# Patient Record
Sex: FEMALE
[De-identification: names, ages, dates, MRNs, and addresses within clinical notes are randomized per-mention and may not be internally consistent; named-entity substitution may affect disease eponyms.]

---

## 2017-10-04 ENCOUNTER — APPOINTMENT (OUTPATIENT)
Dept: ANTEPARTUM | Facility: CLINIC | Age: 27
End: 2017-10-04
Payer: MEDICAID

## 2017-10-04 ENCOUNTER — ASOB RESULT (OUTPATIENT)
Age: 27
End: 2017-10-04

## 2017-10-04 PROBLEM — Z00.00 ENCOUNTER FOR PREVENTIVE HEALTH EXAMINATION: Status: ACTIVE | Noted: 2017-10-04

## 2017-10-04 PROCEDURE — 76801 OB US < 14 WKS SINGLE FETUS: CPT

## 2017-11-08 ENCOUNTER — EMERGENCY (EMERGENCY)
Facility: HOSPITAL | Age: 27
LOS: 1 days | Discharge: HOME | End: 2017-11-08
Attending: EMERGENCY MEDICINE
Payer: COMMERCIAL

## 2017-11-08 VITALS
OXYGEN SATURATION: 100 % | RESPIRATION RATE: 18 BRPM | HEART RATE: 98 BPM | HEIGHT: 59.84 IN | WEIGHT: 134.04 LBS | DIASTOLIC BLOOD PRESSURE: 75 MMHG | TEMPERATURE: 98 F | SYSTOLIC BLOOD PRESSURE: 108 MMHG

## 2017-11-08 LAB
APPEARANCE UR: CLEAR — SIGNIFICANT CHANGE UP
BILIRUB UR-MCNC: NEGATIVE — SIGNIFICANT CHANGE UP
COLOR SPEC: YELLOW — SIGNIFICANT CHANGE UP
DIFF PNL FLD: NEGATIVE — SIGNIFICANT CHANGE UP
GLUCOSE UR QL: NEGATIVE MG/DL — SIGNIFICANT CHANGE UP
KETONES UR-MCNC: NEGATIVE — SIGNIFICANT CHANGE UP
LEUKOCYTE ESTERASE UR-ACNC: NEGATIVE — SIGNIFICANT CHANGE UP
NITRITE UR-MCNC: NEGATIVE — SIGNIFICANT CHANGE UP
PH UR: 6.5 — SIGNIFICANT CHANGE UP (ref 5–8)
PROT UR-MCNC: NEGATIVE MG/DL — SIGNIFICANT CHANGE UP
SP GR SPEC: 1.01 — SIGNIFICANT CHANGE UP (ref 1.01–1.02)
UROBILINOGEN FLD QL: NEGATIVE MG/DL — SIGNIFICANT CHANGE UP

## 2017-11-08 PROCEDURE — 99283 EMERGENCY DEPT VISIT LOW MDM: CPT

## 2017-11-08 PROCEDURE — 99284 EMERGENCY DEPT VISIT MOD MDM: CPT

## 2017-11-08 PROCEDURE — T1013: CPT

## 2017-11-08 PROCEDURE — 81003 URINALYSIS AUTO W/O SCOPE: CPT

## 2017-11-08 PROCEDURE — 87086 URINE CULTURE/COLONY COUNT: CPT

## 2017-11-08 RX ORDER — ACETAMINOPHEN 500 MG
2 TABLET ORAL
Qty: 18 | Refills: 0
Start: 2017-11-08 | End: 2017-11-11

## 2017-11-08 NOTE — ED STATDOCS - PROGRESS NOTE DETAILS
Agreed with HPI/PE/ROS/Orders from intake. Will f/u with results from urine UA-Unremarkable, pt is explained results and discharge instructions, pt verbalizes results and discharge instructions

## 2017-11-08 NOTE — ED STATDOCS - ATTENDING CONTRIBUTION TO CARE
I, Dr. Galicia, performed the initial face to face bedside interview with this patient regarding history of present illness, review of symptoms and relevant past medical, social and family history.  I completed an independent physical examination.  I was the initial provider who evaluated this patient. I have signed out the follow up of any pending tests (i.e. labs, radiological studies) to the ACP.  I have communicated the patient’s plan of care and disposition with the ACP.

## 2017-11-08 NOTE — ED STATDOCS - OBJECTIVE STATEMENT
25 y/o pregnant F pt presents to ED c/o right sided flank pain that onset today at 1400. Pain worsens with deep breaths. She rates her pain a 3/10. Pt is unsure of LMP. Denies trauma or injury. Denies having this pain in the past. Denies taking medications. Denies vaginal bleeding, N/V/D, fever, chills, SOB, and CP. No further complaints at this time.

## 2017-11-09 LAB
CULTURE RESULTS: NO GROWTH — SIGNIFICANT CHANGE UP
SPECIMEN SOURCE: SIGNIFICANT CHANGE UP

## 2018-01-25 ENCOUNTER — INPATIENT (INPATIENT)
Facility: HOSPITAL | Age: 28
LOS: 0 days | Discharge: STILL ADMITTED OR ADMINISTRATIVE | End: 2018-01-26
Attending: OBSTETRICS & GYNECOLOGY | Admitting: OBSTETRICS & GYNECOLOGY
Payer: COMMERCIAL

## 2018-01-25 VITALS
TEMPERATURE: 98 F | WEIGHT: 149.91 LBS | SYSTOLIC BLOOD PRESSURE: 136 MMHG | DIASTOLIC BLOOD PRESSURE: 71 MMHG | HEART RATE: 110 BPM | HEIGHT: 67 IN | OXYGEN SATURATION: 100 % | RESPIRATION RATE: 18 BRPM

## 2018-01-25 VITALS — WEIGHT: 145.51 LBS | HEIGHT: 67 IN

## 2018-01-25 DIAGNOSIS — O15.03: ICD-10-CM

## 2018-01-25 DIAGNOSIS — M79.662 PAIN IN LEFT LOWER LEG: ICD-10-CM

## 2018-01-25 DIAGNOSIS — Z3A.29 29 WEEKS GESTATION OF PREGNANCY: ICD-10-CM

## 2018-01-25 DIAGNOSIS — R56.9 UNSPECIFIED CONVULSIONS: ICD-10-CM

## 2018-01-25 DIAGNOSIS — Z29.9 ENCOUNTER FOR PROPHYLACTIC MEASURES, UNSPECIFIED: ICD-10-CM

## 2018-01-25 LAB
ALBUMIN SERPL ELPH-MCNC: 3.3 G/DL — SIGNIFICANT CHANGE UP (ref 3.3–5.2)
ALP SERPL-CCNC: 58 U/L — SIGNIFICANT CHANGE UP (ref 40–120)
ALT FLD-CCNC: 7 U/L — SIGNIFICANT CHANGE UP
ANION GAP SERPL CALC-SCNC: 18 MMOL/L — HIGH (ref 5–17)
APPEARANCE UR: CLEAR — SIGNIFICANT CHANGE UP
APTT BLD: 27.4 SEC — LOW (ref 27.5–37.4)
AST SERPL-CCNC: 17 U/L — SIGNIFICANT CHANGE UP
BACTERIA # UR AUTO: ABNORMAL
BILIRUB DIRECT SERPL-MCNC: 0.1 MG/DL — SIGNIFICANT CHANGE UP (ref 0–0.3)
BILIRUB INDIRECT FLD-MCNC: 0.1 MG/DL — LOW (ref 0.2–1)
BILIRUB SERPL-MCNC: 0.2 MG/DL — LOW (ref 0.4–2)
BILIRUB UR-MCNC: NEGATIVE — SIGNIFICANT CHANGE UP
BUN SERPL-MCNC: 4 MG/DL — LOW (ref 8–20)
CALCIUM SERPL-MCNC: 9.8 MG/DL — SIGNIFICANT CHANGE UP (ref 8.6–10.2)
CHLORIDE SERPL-SCNC: 97 MMOL/L — LOW (ref 98–107)
CO2 SERPL-SCNC: 18 MMOL/L — LOW (ref 22–29)
COLOR SPEC: YELLOW — SIGNIFICANT CHANGE UP
CREAT ?TM UR-MCNC: 31 MG/DL — SIGNIFICANT CHANGE UP
CREAT SERPL-MCNC: 0.42 MG/DL — LOW (ref 0.5–1.3)
DIFF PNL FLD: ABNORMAL
EPI CELLS # UR: SIGNIFICANT CHANGE UP
GLUCOSE SERPL-MCNC: 76 MG/DL — SIGNIFICANT CHANGE UP (ref 70–115)
GLUCOSE UR QL: 1000 MG/DL
HCT VFR BLD CALC: 35.1 % — LOW (ref 37–47)
HGB BLD-MCNC: 11.8 G/DL — LOW (ref 12–16)
INR BLD: 0.94 RATIO — SIGNIFICANT CHANGE UP (ref 0.88–1.16)
KETONES UR-MCNC: ABNORMAL
LDH SERPL L TO P-CCNC: 147 U/L — SIGNIFICANT CHANGE UP (ref 98–192)
LEUKOCYTE ESTERASE UR-ACNC: NEGATIVE — SIGNIFICANT CHANGE UP
MCHC RBC-ENTMCNC: 29.9 PG — SIGNIFICANT CHANGE UP (ref 27–31)
MCHC RBC-ENTMCNC: 33.6 G/DL — SIGNIFICANT CHANGE UP (ref 32–36)
MCV RBC AUTO: 89.1 FL — SIGNIFICANT CHANGE UP (ref 81–99)
NITRITE UR-MCNC: POSITIVE
PH UR: 7 — SIGNIFICANT CHANGE UP (ref 5–8)
PLATELET # BLD AUTO: 162 K/UL — SIGNIFICANT CHANGE UP (ref 150–400)
POTASSIUM SERPL-MCNC: 3.3 MMOL/L — LOW (ref 3.5–5.3)
POTASSIUM SERPL-SCNC: 3.3 MMOL/L — LOW (ref 3.5–5.3)
PROT ?TM UR-MCNC: 9 MG/DL — SIGNIFICANT CHANGE UP (ref 0–12)
PROT SERPL-MCNC: 6.1 G/DL — LOW (ref 6.6–8.7)
PROT UR-MCNC: NEGATIVE MG/DL — SIGNIFICANT CHANGE UP
PROT/CREAT UR-RTO: 0.3 RATIO — HIGH
PROTHROM AB SERPL-ACNC: 10.3 SEC — SIGNIFICANT CHANGE UP (ref 9.8–12.7)
RBC # BLD: 3.94 M/UL — LOW (ref 4.4–5.2)
RBC # FLD: 13.3 % — SIGNIFICANT CHANGE UP (ref 11–15.6)
RBC CASTS # UR COMP ASSIST: ABNORMAL /HPF (ref 0–4)
SODIUM SERPL-SCNC: 133 MMOL/L — LOW (ref 135–145)
SP GR SPEC: 1.01 — SIGNIFICANT CHANGE UP (ref 1.01–1.02)
URATE SERPL-MCNC: 4.1 MG/DL — SIGNIFICANT CHANGE UP (ref 2.4–5.7)
UROBILINOGEN FLD QL: NEGATIVE MG/DL — SIGNIFICANT CHANGE UP
WBC # BLD: 10.7 K/UL — SIGNIFICANT CHANGE UP (ref 4.8–10.8)
WBC # FLD AUTO: 10.7 K/UL — SIGNIFICANT CHANGE UP (ref 4.8–10.8)
WBC UR QL: SIGNIFICANT CHANGE UP

## 2018-01-25 PROCEDURE — 70450 CT HEAD/BRAIN W/O DYE: CPT | Mod: 26

## 2018-01-25 PROCEDURE — 99236 HOSP IP/OBS SAME DATE HI 85: CPT | Mod: GC

## 2018-01-25 PROCEDURE — 99291 CRITICAL CARE FIRST HOUR: CPT

## 2018-01-25 RX ORDER — MAGNESIUM SULFATE 500 MG/ML
4 VIAL (ML) INJECTION ONCE
Refills: 0 | Status: COMPLETED | OUTPATIENT
Start: 2018-01-25 | End: 2018-01-25

## 2018-01-25 RX ORDER — SODIUM CHLORIDE 9 MG/ML
1000 INJECTION, SOLUTION INTRAVENOUS
Refills: 0 | Status: DISCONTINUED | OUTPATIENT
Start: 2018-01-25 | End: 2018-01-25

## 2018-01-25 RX ORDER — MAGNESIUM SULFATE 500 MG/ML
2 VIAL (ML) INJECTION
Qty: 40 | Refills: 0 | Status: DISCONTINUED | OUTPATIENT
Start: 2018-01-25 | End: 2018-01-26

## 2018-01-25 RX ORDER — SODIUM CHLORIDE 9 MG/ML
1000 INJECTION, SOLUTION INTRAVENOUS
Refills: 0 | Status: DISCONTINUED | OUTPATIENT
Start: 2018-01-25 | End: 2018-01-26

## 2018-01-25 RX ORDER — ACETAMINOPHEN 500 MG
1000 TABLET ORAL ONCE
Refills: 0 | Status: COMPLETED | OUTPATIENT
Start: 2018-01-25 | End: 2018-01-25

## 2018-01-25 RX ADMIN — Medication 400 MILLIGRAM(S): at 17:52

## 2018-01-25 RX ADMIN — Medication 300 GRAM(S): at 17:56

## 2018-01-25 RX ADMIN — SODIUM CHLORIDE 75 MILLILITER(S): 9 INJECTION, SOLUTION INTRAVENOUS at 21:04

## 2018-01-25 RX ADMIN — SODIUM CHLORIDE 999.99 MILLILITER(S): 9 INJECTION, SOLUTION INTRAVENOUS at 17:29

## 2018-01-25 RX ADMIN — Medication 1000 MILLIGRAM(S): at 18:22

## 2018-01-25 RX ADMIN — Medication 50 GM/HR: at 18:20

## 2018-01-25 NOTE — ED PROVIDER NOTE - CRITICAL CARE PROVIDED
direct patient care (not related to procedure)/additional history taking/documentation/consultation with other physicians/consult w/ pt's family directly relating to pts condition

## 2018-01-25 NOTE — CONSULT NOTE ADULT - PROBLEM SELECTOR RECOMMENDATION 4
Will order US doppler bilateral lower ext to rule out dvt [place]ed prenatal care [place], no pregnancy complications reported by patient. Healthy fetus based on current fetal heart rate monitoring findings. Recommend close monitoring of maternal and fetal status

## 2018-01-25 NOTE — CONSULT NOTE ADULT - ASSESSMENT
28 yo  CHAD 18 by 1st trimester ultrasound done on 10/4/17. She is now 29 weeks and 4 days Gestational Age presents with generalized seizure most likely due to eclampsia. Patient has no prior history of seizure activity. 26 yo  CHAD 18 by 1st trimester ultrasound done on 10/4/17. She is now 29 weeks and 4 days Gestational Age. Presented with generalized seizure activity most likely due to eclampsia. Patient has no prior history of seizure activity. She has mild left leg calf pain.

## 2018-01-25 NOTE — ED PROVIDER NOTE - ENMT, MLM
erythema right lateral tongue. pain with opening jaw. can feel click on opening and closing. crepitus erythema right lateral tongue. pain with opening jaw. can feel click on opening and closing. crepitus teeth are aligned

## 2018-01-25 NOTE — ED PROVIDER NOTE - OBJECTIVE STATEMENT
26 y/o F pt BIBA c/o lasted 5 minutes s/p witnessed seizure in front of boyfriend today 1 hour ago. Pt is 7 months pregnant and has had an uneventful pregnancy. She was found to be foaming at the mouth and vomiting with seizure like activity for about 5 minutes. This is the first episode like this. No recent fevers. Pt is feeling a headache, tired and confused after the episode. She does not remember the event. Possible lip bite. Grandmother has a hx of seizures. No EtOH, drugs.

## 2018-01-25 NOTE — CONSULT NOTE ADULT - SUBJECTIVE AND OBJECTIVE BOX
CHIEF COMPLAINT: first seizure    HPI: 27yFemale  26 y/o F pt BIBA c/o lasted 5 minutes s/p witnessed seizure in front of boyfriend today 1 hour ago. Pt is 7 months pregnant and has had an uneventful pregnancy. She was found to be foaming at the mouth and vomiting with seizure like activity for about 5 minutes. This is the first episode like this. No recent fevers. Pt is feeling a headache, tired and confused after the episode. She does not remember the event. Possible lip bite. Grandmother has a hx of seizures. No EtOH, drugs.     She denies h/o head trauma, drug use.   Receiving magnesium at present      PAST MEDICAL & SURGICAL HISTORY:  No pertinent past medical history  No significant past surgical history    MEDICATIONS  (STANDING):  lactated ringers 1000 milliLiter(s) (75 mL/Hr) IV Continuous <Continuous>  lactated ringers. 1000 milliLiter(s) (75 mL/Hr) IV Continuous <Continuous>  magnesium sulfate Infusion 2 Gm/Hr (50 mL/Hr) IV Continuous <Continuous>    MEDICATIONS  (PRN):    Allergies    No Known Allergies    Intolerances        FAMILY HISTORY:  Family history of seizure disorder (Grandparent): grandmother          SOCIAL HISTORY:    Tobacco:  no  Alcohol:  no  Drugs:  no        REVIEW OF SYSTEMS:    Relevant systems are negative except as noted in the chart, HPI, and PMH      VITAL SIGNS:  Vital Signs Last 24 Hrs  T(C): 36.6 (25 Jan 2018 15:52), Max: 36.6 (25 Jan 2018 15:52)  T(F): 97.8 (25 Jan 2018 15:52), Max: 97.8 (25 Jan 2018 15:52)  HR: 110 (25 Jan 2018 15:52) (110 - 110)  BP: 136/71 (25 Jan 2018 15:52) (136/71 - 136/71)  BP(mean): --  RR: 18 (25 Jan 2018 15:52) (18 - 18)  SpO2: 100% (25 Jan 2018 15:52) (100% - 100%)    PHYSICAL EXAMINATION:    General: Well-developed, well nourished, in no acute distress.  Cardiac:  Regular rate and rhythm. No carotid bruits appreciated.  Eyes: Fundoscopic examination was deferred.  Neurologic:  - Mental Status:  Alert, awake, oriented to person, place, and time; Speech is fluent. Language is normal. Follows commands well.  Insight and knowledge appear appropriate.  Cranial Nerves II-XII:    II:  Visual acuity is normal for age ; Visual fields are full to confrontation; Pupils are equal, round, and reactive to light.  III, IV, VI:  Extraocular movements are intact without nystagmus.  V:  Facial sensation is intact in the V1-V3 distribution bilaterally.  VII:  Face is symmetric with normal eye closure and smile  VIII:  Hearing is grossly intact  IX, X, XII:  speech is clear  XI:  Head turning and shoulder shrug are intact.  - Motor:  Strength is 5/5 x 4.   There is no pronator drift. .  - Reflexes:  2+ and symmetric at the knees.  Plantar responses flexor.  - Sensory:  Symmetric to light touch  - Coordination:  Finger-nose-finger is normal. Rapid alternating hand and foot  movements are intact. Dexterity appears normal      LABS:                          11.8   10.7  )-----------( 162      ( 25 Jan 2018 16:36 )             35.1     25 Jan 2018 16:36    133    |  97     |  4.0    ----------------------------<  76     3.3     |  18.0   |  0.42     Ca    9.8        25 Jan 2018 16:36    TPro  6.1    /  Alb  3.3    /  TBili  0.2    /  DBili  0.1    /  AST  17     /  ALT  7      /  AlkPhos  58     25 Jan 2018 18:42    LIVER FUNCTIONS - ( 25 Jan 2018 18:42 )  Alb: 3.3 g/dL / Pro: 6.1 g/dL / ALK PHOS: 58 U/L / ALT: 7 U/L / AST: 17 U/L / GGT: x           PT/INR - ( 25 Jan 2018 18:41 )   PT: 10.3 sec;   INR: 0.94 ratio         PTT - ( 25 Jan 2018 18:41 )  PTT:27.4 sec      RADIOLOGY & ADDITIONAL STUDIES:    < from: CT Head No Cont (01.25.18 @ 16:38) >   Ventricles and cortical sulci are within normal limits. There is   evidence of a few scattered cortical calcifications involving the   cerebral hemispheres that may represent sequela of remote infection.   There is no evidence of any mass effect or extra-axial collection.    Bony calvarium, visualized mastoids, sinuses and orbits are unremarkable.      IMPRESSION:     No CT evidence of any acute intracranial abnormality.      < end of copied text >    IMPRESSION:    27 year old woman, 7 months pregnant presents with first seizure. Normal exam and benign history otherwise.  CT shows calcifications which could represent remote cysticercosis thereby increasing riskl of developing epilepsy.  Does not appear to be eclamptic.    PLAN:  1. MRI brain  2. EEG  3. Medical and Ob assessment and management  4. Defer seizure meds for now. Would suggest keppra if she has further seizures or if studies are compelling

## 2018-01-25 NOTE — ED PROVIDER NOTE - PROGRESS NOTE DETAILS
spoke with neuro - recommend  - ct then MRI keppra for further seizures if deemed not eclamptic - dr che here will take to L and D after CT which pt does consent to

## 2018-01-25 NOTE — CONSULT NOTE ADULT - SUBJECTIVE AND OBJECTIVE BOX
CANDE KINNEY  A 26 yo  CHAD 18 by 1st trimester ultrasound done on 10/4/17. She is now 29 weeks and 4 days Gestational Age    Patient with Chief complaint of seizure activity. Seizure was tonic clonic generalized, patient was shaking, witnessed by family.  She was brought in by ambulance to the ED and transferred to L&D. No prior history of seizures.       REVIEW OF SYSTEMS:    CONSTITUTIONAL: No weakness, fevers or chills  EYES/ENT: No visual changes;  No vertigo or throat pain   NECK: No pain or stiffness  RESPIRATORY: No cough, wheezing, hemoptysis; No shortness of breath  CARDIOVASCULAR: No chest pain or palpitations  GASTROINTESTINAL: No abdominal or epigastric pain. No nausea, vomiting, or hematemesis; No diarrhea or constipation. No melena or hematochezia.  GENITOURINARY: No dysuria, frequency or hematuria  NEUROLOGICAL: No numbness or weakness  SKIN: No itching, burning, rashes, or lesions   All other review of systems is negative unless indicated above.  PAST MEDICAL & SURGICAL HISTORY:  No pertinent past medical history  No significant past surgical history      Allergies  No Known Allergies      FAMILY HISTORY:      Social History: Denies ETOH, smoking and drugs.   POB/GYN Hx:    Vital Signs:  Vital Signs Last 24 Hrs  T(C): 36.6 (2018 15:52), Max: 36.6 (2018 15:52)  T(F): 97.8 (2018 15:52), Max: 97.8 (2018 15:52)  HR: 110 (2018 15:52) (110 - 110)  BP: 136/71 (2018 15:52) (136/71 - 136/71)  RR: 18 (2018 15:52) (18 - 18)  SpO2: 100% (2018 15:52) (100% - 100%)    Physical Exam:  General: Adult female in NAD  Head/Neck: No neck masses, no lymphadenopathy  CVS: RRR, +S1/S2  Lungs: CTAB, no wheeze, ronchi or rales.   Breast: No tenderness or abnormal discharge.  Abdomen: +BS, soft, NT.  Pelvic: Deferred  Ext: No cyanosis, edema or calf tenderness.   Skin: No rashes, or lesions  Neuro: Normal DTRs, grossly intact    Labs:                          11.8   10.7  )-----------( 162      ( 2018 16:36 )             35.1         133<L>  |  97<L>  |  4.0<L>  ----------------------------<  76  3.3<L>   |  18.0<L>  |  0.42<L>    Ca    9.8      2018 16:36            Radiology:  < from: CT Head No Cont (18 @ 16:38) >  FINDINGS:      Ventricles and cortical sulci are within normal limits. There is   evidence of a few scattered cortical calcifications involving the   cerebral hemispheres that may represent sequela of remote infection.   There is no evidence of any mass effect or extra-axial collection.    Bony calvarium, visualized mastoids, sinuses and orbits are unremarkable.      IMPRESSION:     No CT evidence of any acute intracranial abnormality.    Additional findings as above.          MEDICATIONS  (STANDING):  dextrose 5% + sodium chloride 0.9%. 1000 milliLiter(s) (999.99 mL/Hr) IV Continuous <Continuous>  magnesium sulfate Infusion 2 Gm/Hr (50 mL/Hr) IV Continuous <Continuous> CANDE KINNEY  A 26 yo  CHAD 18 by 1st trimester ultrasound done on 10/4/17. She is now 29 weeks and 4 days Gestational Age    Patient with Chief complaint of seizure activity. Seizure was tonic clonic generalized, patient was shaking, witnessed by family.  She was brought in by ambulance to the ED and transferred to L&D. No prior history of seizures.       REVIEW OF SYSTEMS:    CONSTITUTIONAL: No weakness, fevers or chills  EYES/ENT: No visual changes;  No vertigo or throat pain   NECK: No pain or stiffness  RESPIRATORY: No cough, wheezing, hemoptysis; No shortness of breath  CARDIOVASCULAR: No chest pain or palpitations  GASTROINTESTINAL: No abdominal or epigastric pain. No nausea, vomiting, or hematemesis; No diarrhea or constipation. No melena or hematochezia.  GENITOURINARY: No dysuria, frequency or hematuria  NEUROLOGICAL: No numbness or weakness  SKIN: No itching, burning, rashes, or lesions   All other review of systems is negative unless indicated above.  PAST MEDICAL & SURGICAL HISTORY:  No pertinent past medical history  No significant past surgical history      Allergies  No Known Allergies      FAMILY HISTORY:  Father alive and well.     Social History: Denies ETOH, smoking and drugs.     POB/GYN Hx:  None    Vital Signs:  Vital Signs Last 24 Hrs  T(C): 36.6 (2018 15:52), Max: 36.6 (2018 15:52)  T(F): 97.8 (2018 15:52), Max: 97.8 (2018 15:52)  HR: 110 (2018 15:52) (110 - 110)  BP: 136/71 (2018 15:52) (136/71 - 136/71)  RR: 18 (2018 15:52) (18 - 18)  SpO2: 100% (2018 15:52) (100% - 100%)    Physical Exam:  General: Adult female in NAD  Head/Neck: No neck masses, no lymphadenopathy  CVS: RRR, +S1/S2  Lungs: CTAB, no wheeze, ronchi or rales.   Breast: No tenderness or abnormal discharge.  Abdomen: +BS, soft, NT.  Pelvic: Deferred  Ext: No cyanosis, edema or calf tenderness.   Skin: No rashes, or lesions  Neuro: Normal DTRs, grossly intact  FHR: 135, moderate variation, no decelerations. No uterine contractions noted.     Labs:                          11.8   10.7  )-----------( 162      ( 2018 16:36 )             35.1         133<L>  |  97<L>  |  4.0<L>  ----------------------------<  76  3.3<L>   |  18.0<L>  |  0.42<L>    Ca    9.8      2018 16:36            Radiology:  < from: CT Head No Cont (18 @ 16:38) >  FINDINGS:      Ventricles and cortical sulci are within normal limits. There is   evidence of a few scattered cortical calcifications involving the   cerebral hemispheres that may represent sequela of remote infection.   There is no evidence of any mass effect or extra-axial collection.    Bony calvarium, visualized mastoids, sinuses and orbits are unremarkable.      IMPRESSION:     No CT evidence of any acute intracranial abnormality.    Additional findings as above.          MEDICATIONS  (STANDING):  dextrose 5% + sodium chloride 0.9%. 1000 milliLiter(s) (999.99 mL/Hr) IV Continuous <Continuous>  magnesium sulfate Infusion 2 Gm/Hr (50 mL/Hr) IV Continuous <Continuous> CANDE KINNEY  A 28 yo  CHAD 18 by 1st trimester ultrasound done on 10/4/17. She is now 29 weeks and 4 days Gestational Age    Patient with Chief complaint of seizure activity. Seizure was tonic clonic generalized, patient was shaking, witnessed by family.  She was brought in by ambulance to the ED and transferred to L&D. No prior history of seizures.       REVIEW OF SYSTEMS:  CONSTITUTIONAL: No weakness, fevers or chills  EYES/ENT: No visual changes;  No vertigo or throat pain   NECK: No pain or stiffness  RESPIRATORY: No cough, wheezing, hemoptysis; No shortness of breath  CARDIOVASCULAR: No chest pain or palpitations  GASTROINTESTINAL: No abdominal or epigastric pain. No nausea, vomiting, or hematemesis; No diarrhea or constipation. No melena or hematochezia.  GENITOURINARY: No dysuria, frequency or hematuria  NEUROLOGICAL: No numbness or weakness  SKIN: No itching, burning, rashes, or lesions   Extremities: left calf pain with onset after seizure episode  All other review of systems is negative unless indicated above.    PAST MEDICAL & SURGICAL HISTORY:  No pertinent past medical history  No significant past surgical history    Allergies  No Known Allergies    FAMILY HISTORY:  Father alive and well.     Social History: Denies ETOH, smoking and drugs.     POB/GYN Hx:  No prior pregnancies.    Vital Signs:  Vital Signs Last 24 Hrs  T(C): 36.6 (2018 15:52), Max: 36.6 (2018 15:52)  T(F): 97.8 (2018 15:52), Max: 97.8 (2018 15:52)  HR: 110 (2018 15:52) (110 - 110)  BP: 136/71 (2018 15:52) (136/71 - 136/71)  RR: 18 (2018 15:52) (18 - 18)  SpO2: 100% (2018 15:52) (100% - 100%)    Physical Exam:  General: Adult female in NAD  Head/Neck: No neck masses, no lymphadenopathy  CVS: RRR, +S1/S2  Lungs: CTAB, no wheeze, rhonchi or rales.   Breast: No tenderness or abnormal discharge.  Abdomen: +BS, soft, NT.  Pelvic: Deferred  Ext: No cyanosis, or edema. Mild left leg calf tenderness.   Skin: No rashes, or lesions  Neuro: Normal DTRs, grossly intact    FHR: 135, moderate variation, no decelerations. No uterine contractions noted.     Labs:                      11.8   10.7  )-----------( 162      ( 2018 16:36 )             35.1         133<L>  |  97<L>  |  4.0<L>  ----------------------------<  76  3.3<L>   |  18.0<L>  |  0.42<L>    Ca    9.8      2018 16:36      Radiology:  < from: CT Head No Cont (18 @ 16:38) >  FINDINGS:   Ventricles and cortical sulci are within normal limits. There is   evidence of a few scattered cortical calcifications involving the   cerebral hemispheres that may represent sequela of remote infection.   There is no evidence of any mass effect or extra-axial collection.  Bony calvarium, visualized mastoids, sinuses and orbits are unremarkable.    IMPRESSION:   No CT evidence of any acute intracranial abnormality.      MEDICATIONS  (STANDING):  dextrose 5% + sodium chloride 0.9%. 1000 milliLiter(s) (999.99 mL/Hr) IV Continuous <Continuous>  magnesium sulfate Infusion 2 Gm/Hr (50 mL/Hr) IV Continuous <Continuous>

## 2018-01-25 NOTE — ED ADULT TRIAGE NOTE - CHIEF COMPLAINT QUOTE
PAtient BIBA, as per sister in law, brother found patient "foaming at the mouth and vomited blood" then was confused, patient is 7 months pregnant, alert and orientedx3 at this time

## 2018-01-25 NOTE — CONSULT NOTE ADULT - PROBLEM SELECTOR RECOMMENDATION 2
Started prenatal care at [facility], no pregnancy complications reported by patient. Will order US Doppler bilateral lower ext to rule out DVT

## 2018-01-25 NOTE — ED PROVIDER NOTE - MEDICAL DECISION MAKING DETAILS
26 y/o F pt BIBA s/p first seizure today. Positive tongue bite. Brief bedside US shows fetal heartrate in the 100s, positive fetal movement, and moderate amniotic fluid. Pt consent to CT head. OB at bedside. Do workup, and neuro on call. Will watch closely for HTN, preeclampsia, and more seizures. Will shield abdomen during CT

## 2018-01-25 NOTE — CONSULT NOTE ADULT - PROBLEM SELECTOR RECOMMENDATION 9
Seizure episode most likely eclampsia even though blood pressure is not elevated. There is no history of prior seizure disorders. Will consult neurology to evaluate patient to see if she has underlying seizure disorder. Recommend treatment with magnesium sulfate for 24 hours and will order eclampsia labs. Seizure episode most likely eclampsia even though blood pressure has not been elevated. There is no history of prior seizure disorders. Will consult neurology to evaluate patient to see if she has underlying seizure disorder. Recommend treatment with magnesium sulfate for 24 hours and will order eclampsia labs. Recommend close monitoring of maternal and fetal status

## 2018-01-26 ENCOUNTER — TRANSCRIPTION ENCOUNTER (OUTPATIENT)
Age: 28
End: 2018-01-26

## 2018-01-26 DIAGNOSIS — E83.51 HYPOCALCEMIA: ICD-10-CM

## 2018-01-26 DIAGNOSIS — E87.6 HYPOKALEMIA: ICD-10-CM

## 2018-01-26 PROBLEM — Z00.00 ENCOUNTER FOR PREVENTIVE HEALTH EXAMINATION: Status: ACTIVE | Noted: 2018-01-26

## 2018-01-26 LAB
ALBUMIN SERPL ELPH-MCNC: 3.4 G/DL — SIGNIFICANT CHANGE UP (ref 3.3–5.2)
ALBUMIN SERPL ELPH-MCNC: 3.5 G/DL — SIGNIFICANT CHANGE UP (ref 3.3–5.2)
ALP SERPL-CCNC: 61 U/L — SIGNIFICANT CHANGE UP (ref 40–120)
ALT FLD-CCNC: 9 U/L — SIGNIFICANT CHANGE UP
ANION GAP SERPL CALC-SCNC: 13 MMOL/L — SIGNIFICANT CHANGE UP (ref 5–17)
ANION GAP SERPL CALC-SCNC: 14 MMOL/L — SIGNIFICANT CHANGE UP (ref 5–17)
APPEARANCE UR: ABNORMAL
APTT BLD: 28.5 SEC — SIGNIFICANT CHANGE UP (ref 27.5–37.4)
AST SERPL-CCNC: 20 U/L — SIGNIFICANT CHANGE UP
AST SERPL-CCNC: 23 U/L — SIGNIFICANT CHANGE UP
BACTERIA # UR AUTO: ABNORMAL
BILIRUB SERPL-MCNC: 0.3 MG/DL — LOW (ref 0.4–2)
BILIRUB UR-MCNC: NEGATIVE — SIGNIFICANT CHANGE UP
BLD GP AB SCN SERPL QL: SIGNIFICANT CHANGE UP
BUN SERPL-MCNC: <3 MG/DL — LOW (ref 8–20)
CA-I BLD-SCNC: 0.93 MMOL/L — LOW (ref 1.12–1.3)
CALCIUM SERPL-MCNC: 6.3 MG/DL — CRITICAL LOW (ref 8.6–10.2)
CALCIUM SERPL-MCNC: 7.2 MG/DL — LOW (ref 8.6–10.2)
CHLORIDE SERPL-SCNC: 104 MMOL/L — SIGNIFICANT CHANGE UP (ref 98–107)
CHLORIDE SERPL-SCNC: 105 MMOL/L — SIGNIFICANT CHANGE UP (ref 98–107)
CO2 SERPL-SCNC: 21 MMOL/L — LOW (ref 22–29)
COLOR SPEC: YELLOW — SIGNIFICANT CHANGE UP
CREAT ?TM UR-MCNC: 27 MG/DL — SIGNIFICANT CHANGE UP
CREAT SERPL-MCNC: 0.33 MG/DL — LOW (ref 0.5–1.3)
DIFF PNL FLD: ABNORMAL
EPI CELLS # UR: SIGNIFICANT CHANGE UP
GLUCOSE SERPL-MCNC: 111 MG/DL — SIGNIFICANT CHANGE UP (ref 70–115)
GLUCOSE SERPL-MCNC: 96 MG/DL — SIGNIFICANT CHANGE UP (ref 70–115)
GLUCOSE UR QL: NEGATIVE MG/DL — SIGNIFICANT CHANGE UP
HCT VFR BLD CALC: 30.2 % — LOW (ref 37–47)
HGB BLD-MCNC: 10 G/DL — LOW (ref 12–16)
HIV 1 & 2 AB SERPL IA.RAPID: SIGNIFICANT CHANGE UP
INR BLD: 0.95 RATIO — SIGNIFICANT CHANGE UP (ref 0.88–1.16)
KETONES UR-MCNC: ABNORMAL
LDH SERPL L TO P-CCNC: 163 U/L — SIGNIFICANT CHANGE UP (ref 98–192)
LEUKOCYTE ESTERASE UR-ACNC: ABNORMAL
MAGNESIUM SERPL-MCNC: 4.6 MG/DL — HIGH (ref 1.6–2.6)
MAGNESIUM SERPL-MCNC: 4.8 MG/DL — HIGH (ref 1.6–2.6)
MAGNESIUM SERPL-MCNC: 6.1 MG/DL — HIGH (ref 1.6–2.6)
MCHC RBC-ENTMCNC: 29.8 PG — SIGNIFICANT CHANGE UP (ref 27–31)
MCHC RBC-ENTMCNC: 33.1 G/DL — SIGNIFICANT CHANGE UP (ref 32–36)
MCV RBC AUTO: 89.9 FL — SIGNIFICANT CHANGE UP (ref 81–99)
NITRITE UR-MCNC: NEGATIVE — SIGNIFICANT CHANGE UP
PH UR: 6 — SIGNIFICANT CHANGE UP (ref 5–8)
PLATELET # BLD AUTO: 148 K/UL — LOW (ref 150–400)
POTASSIUM SERPL-MCNC: 3.2 MMOL/L — LOW (ref 3.5–5.3)
POTASSIUM SERPL-MCNC: 3.4 MMOL/L — LOW (ref 3.5–5.3)
POTASSIUM SERPL-SCNC: 3.2 MMOL/L — LOW (ref 3.5–5.3)
POTASSIUM SERPL-SCNC: 3.4 MMOL/L — LOW (ref 3.5–5.3)
PROT ?TM UR-MCNC: 14 MG/DL — HIGH (ref 0–12)
PROT SERPL-MCNC: 6.3 G/DL — LOW (ref 6.6–8.7)
PROT SERPL-MCNC: 6.4 G/DL — LOW (ref 6.6–8.7)
PROT UR-MCNC: 30 MG/DL
PROT/CREAT UR-RTO: 0.5 RATIO — SIGNIFICANT CHANGE UP
PROTHROM AB SERPL-ACNC: 10.4 SEC — SIGNIFICANT CHANGE UP (ref 9.8–12.7)
RBC # BLD: 3.36 M/UL — LOW (ref 4.4–5.2)
RBC # FLD: 13.6 % — SIGNIFICANT CHANGE UP (ref 11–15.6)
RBC CASTS # UR COMP ASSIST: >50 /HPF (ref 0–4)
SODIUM SERPL-SCNC: 139 MMOL/L — SIGNIFICANT CHANGE UP (ref 135–145)
SP GR SPEC: 1.01 — SIGNIFICANT CHANGE UP (ref 1.01–1.02)
T PALLIDUM AB TITR SER: NEGATIVE — SIGNIFICANT CHANGE UP
TYPE + AB SCN PNL BLD: SIGNIFICANT CHANGE UP
URATE SERPL-MCNC: 3.7 MG/DL — SIGNIFICANT CHANGE UP (ref 2.4–5.7)
UROBILINOGEN FLD QL: NEGATIVE MG/DL — SIGNIFICANT CHANGE UP
WBC # BLD: 12.8 K/UL — HIGH (ref 4.8–10.8)
WBC # FLD AUTO: 12.8 K/UL — HIGH (ref 4.8–10.8)
WBC UR QL: SIGNIFICANT CHANGE UP

## 2018-01-26 PROCEDURE — 70551 MRI BRAIN STEM W/O DYE: CPT

## 2018-01-26 PROCEDURE — 76377 3D RENDER W/INTRP POSTPROCES: CPT | Mod: 26

## 2018-01-26 PROCEDURE — 93970 EXTREMITY STUDY: CPT

## 2018-01-26 PROCEDURE — 86901 BLOOD TYPING SEROLOGIC RH(D): CPT

## 2018-01-26 PROCEDURE — 81001 URINALYSIS AUTO W/SCOPE: CPT

## 2018-01-26 PROCEDURE — 95819 EEG AWAKE AND ASLEEP: CPT

## 2018-01-26 PROCEDURE — 85610 PROTHROMBIN TIME: CPT

## 2018-01-26 PROCEDURE — 36415 COLL VENOUS BLD VENIPUNCTURE: CPT

## 2018-01-26 PROCEDURE — 83615 LACTATE (LD) (LDH) ENZYME: CPT

## 2018-01-26 PROCEDURE — 85730 THROMBOPLASTIN TIME PARTIAL: CPT

## 2018-01-26 PROCEDURE — 82330 ASSAY OF CALCIUM: CPT

## 2018-01-26 PROCEDURE — 99285 EMERGENCY DEPT VISIT HI MDM: CPT | Mod: 25

## 2018-01-26 PROCEDURE — 76377 3D RENDER W/INTRP POSTPROCES: CPT

## 2018-01-26 PROCEDURE — 86900 BLOOD TYPING SEROLOGIC ABO: CPT

## 2018-01-26 PROCEDURE — 93970 EXTREMITY STUDY: CPT | Mod: 26

## 2018-01-26 PROCEDURE — 86850 RBC ANTIBODY SCREEN: CPT

## 2018-01-26 PROCEDURE — 70551 MRI BRAIN STEM W/O DYE: CPT | Mod: 26

## 2018-01-26 PROCEDURE — 80076 HEPATIC FUNCTION PANEL: CPT

## 2018-01-26 PROCEDURE — 70450 CT HEAD/BRAIN W/O DYE: CPT

## 2018-01-26 PROCEDURE — 99221 1ST HOSP IP/OBS SF/LOW 40: CPT | Mod: GC

## 2018-01-26 PROCEDURE — 82962 GLUCOSE BLOOD TEST: CPT

## 2018-01-26 PROCEDURE — 80053 COMPREHEN METABOLIC PANEL: CPT

## 2018-01-26 PROCEDURE — 86780 TREPONEMA PALLIDUM: CPT

## 2018-01-26 PROCEDURE — 86703 HIV-1/HIV-2 1 RESULT ANTBDY: CPT

## 2018-01-26 PROCEDURE — 84550 ASSAY OF BLOOD/URIC ACID: CPT

## 2018-01-26 PROCEDURE — 85027 COMPLETE CBC AUTOMATED: CPT

## 2018-01-26 PROCEDURE — 84156 ASSAY OF PROTEIN URINE: CPT

## 2018-01-26 PROCEDURE — 83735 ASSAY OF MAGNESIUM: CPT

## 2018-01-26 PROCEDURE — 84132 ASSAY OF SERUM POTASSIUM: CPT

## 2018-01-26 PROCEDURE — 80048 BASIC METABOLIC PNL TOTAL CA: CPT

## 2018-01-26 RX ORDER — LEVETIRACETAM 250 MG/1
1 TABLET, FILM COATED ORAL
Qty: 60 | Refills: 0
Start: 2018-01-26 | End: 2018-02-24

## 2018-01-26 RX ORDER — CALCIUM CARBONATE 500(1250)
1 TABLET ORAL
Refills: 0 | Status: DISCONTINUED | OUTPATIENT
Start: 2018-01-26 | End: 2018-01-26

## 2018-01-26 RX ORDER — POTASSIUM CHLORIDE 20 MEQ
40 PACKET (EA) ORAL EVERY 4 HOURS
Refills: 0 | Status: COMPLETED | OUTPATIENT
Start: 2018-01-26 | End: 2018-01-26

## 2018-01-26 RX ADMIN — Medication 1 TABLET(S): at 18:37

## 2018-01-26 RX ADMIN — Medication 40 MILLIEQUIVALENT(S): at 13:54

## 2018-01-26 RX ADMIN — SODIUM CHLORIDE 75 MILLILITER(S): 9 INJECTION, SOLUTION INTRAVENOUS at 12:44

## 2018-01-26 RX ADMIN — Medication 50 GM/HR: at 16:12

## 2018-01-26 RX ADMIN — Medication 40 MILLIEQUIVALENT(S): at 10:35

## 2018-01-26 RX ADMIN — Medication 40 MILLIEQUIVALENT(S): at 18:37

## 2018-01-26 RX ADMIN — Medication 1 TABLET(S): at 12:44

## 2018-01-26 NOTE — PROGRESS NOTE ADULT - PROBLEM SELECTOR PLAN 1
Patient is being treated for seizure episode assumed to be eclampsia.   There is no history of prior seizure disorders.   BP readings have been within normal limits. Denies having headaches, epigastric pain or disturbances in vision.   Consult from neurology believes she had noneclamptic seizure. Pending MRI and EEG studies at this time. Holding off Keppra unless patient has recurrent events as per neuro.   Continue treatment with magnesium sulfate for 24 hours total treatment.   Recommend close monitoring of maternal and fetal status.

## 2018-01-26 NOTE — DISCHARGE NOTE OB - HOSPITAL COURSE
Patient is a 28yo  at 29 4/7 weeks who was admitted after having witnessed seizure activity by  18. She presented to ED and was transferred to L&D for workup. She was seen by neurology and had negative workup. She remained normotensive with negative preeclampsia workup. She was found to be in stable condition and was discharged home with recommended outpatient follow-up at [place] or Dr. Juan or [facility] if she prefers a closer clinic. Patient is a 26yo  at 29 4/7 weeks (CHAD 18) who was admitted after having witnessed seizure activity by  18. She presented to ED and was transferred to L&D for workup. She was seen by neurology and had negative workup. She remained normotensive with negative preeclampsia workup. She was found to be in stable condition and was discharged home with recommended outpatient follow-up with Dr. Juan  at 130pm.

## 2018-01-26 NOTE — PROGRESS NOTE ADULT - ASSESSMENT
26 yo  CHAD 18 by 1st trimester ultrasound done on 10/4/17. She is now 29 weeks and 5 days Gestational Age. Presented with generalized seizure activity most likely due to eclampsia. Patient has no prior history of seizure activity. Neurology assessment suggestive of non-eclamptic seizure. 28 yo  CHAD 18 by 1st trimester ultrasound done on 10/4/17. She is now 29 weeks and 5 days Gestational Age. Presented with generalized seizure activity believed to be due to eclampsia. Patient has no prior history of seizure activity. Neurology assessment suggestive of non-eclamptic seizure.

## 2018-01-26 NOTE — PROGRESS NOTE ADULT - SUBJECTIVE AND OBJECTIVE BOX
INTERVAL HISTORY:  doing well. Asymptomatic - denies headache weakness, numbness      VITAL SIGNS:  Vital Signs Last 24 Hrs  T(C): 36.6 (25 Jan 2018 15:52), Max: 36.6 (25 Jan 2018 15:52)  T(F): 97.8 (25 Jan 2018 15:52), Max: 97.8 (25 Jan 2018 15:52)  HR: 110 (25 Jan 2018 15:52) (110 - 110)  BP: 136/71 (25 Jan 2018 15:52) (136/71 - 136/71)  BP(mean): --  RR: 18 (25 Jan 2018 15:52) (18 - 18)  SpO2: 100% (25 Jan 2018 15:52) (100% - 100%)    PHYSICAL EXAMINATION:    Mentation:  nl  Language/Speech: nl [place]  CN:nl  Visual Fields: nl  Motor: nl  Sensory: nl  DTR: 2+  Babinski: plantar      MEDS:  MEDICATIONS  (STANDING):  calcium carbonate 500 mG (Tums) Chewable 1 Tablet(s) Chew four times a day  lactated ringers 1000 milliLiter(s) (75 mL/Hr) IV Continuous <Continuous>  lactated ringers. 1000 milliLiter(s) (75 mL/Hr) IV Continuous <Continuous>  magnesium sulfate Infusion 2 Gm/Hr (50 mL/Hr) IV Continuous <Continuous>  potassium chloride    Tablet ER 40 milliEquivalent(s) Oral every 4 hours    MEDICATIONS  (PRN):      LABS:                          10.0   12.8  )-----------( 148      ( 26 Jan 2018 06:46 )             30.2     01-26    139  |  105  |  <3.0<L>  ----------------------------<  96  3.2<L>   |  21.0<L>  |  0.33<L>    Ca    6.3<LL>      26 Jan 2018 06:46  Mg     6.1     01-26    TPro  6.3<L>  /  Alb  3.4  /  TBili  0.3<L>  /  DBili  x   /  AST  23  /  ALT  9   /  AlkPhos  61  01-26    LIVER FUNCTIONS - ( 26 Jan 2018 06:46 )  Alb: 3.4 g/dL / Pro: 6.3 g/dL / ALK PHOS: 61 U/L / ALT: 9 U/L / AST: 23 U/L / GGT: x               RADIOLOGY & ADDITIONAL STUDIES:      < from: MR Brain-Seizure, Epilepsy No Cont (01.26.18 @ 10:18) >  FINDINGS:  Small anterior middle cranial fossa arachnoid cyst image 11, series 5   measures 2.3 x 2.4 2.2 cm.  There is no abnormal restricted diffusion to suggest acute infarction.   Normal signal is demonstrated throughout the brain parenchyma. Normal T2   flow-voids are seen within  the intracranial vasculature. The lateral   ventricles and cortical sulci are otherwise age-appropriate in size and   configuration. There is no other mass, mass effect, or extra-axial fluid   collection. There is no susceptibility artifact to suggest hemorrhage.   Midline structures are normal.  Mild inflammatory mucosal changes are   seen throughout the various portions of the paranasal sinuses. The orbits   and mastoid air cells are unremarkable.     IMPRESSION: No acute intracranial pathology. Additional findings above.      < end of copied text >    IMPRESSION & PLAN:    Patient is doing well and is asymptomatic. MRI is normal ( small arachnoid cyst only)  Not on seizure meds  EEG is about to be started. She is cleared to be discharged from neuro perspective.  Will review EEG once completed and will recontact patient if there are any actionable findings.

## 2018-01-26 NOTE — DISCHARGE NOTE OB - PATIENT PORTAL LINK FT
“You can access the [facility] Patient Portal, offered by [facility], by registering with the following website: [email or web address]”

## 2018-01-26 NOTE — PROGRESS NOTE ADULT - SUBJECTIVE AND OBJECTIVE BOX
CANDE KINNEY  A 26 yo  CHAD 20 by 1st trimester ultrasound done on 10/4/17. She is now 29 weeks and 5 days Gestational Age    Patient with Chief complaint of seizure activity. Seizure was tonic clonic generalized, patient was shaking, witnessed by family.  She was brought in by ambulance to the ED and transferred to L&D. No prior history of seizures.     Patient is status post Mag Sulfate for seizure prophylaxis and seen by Neurology who believes that patient may have had non-eclamptic seizure with plan to undergo MRI and EEG today. Recommends giving Keppra for recurrent seizure event.    Today, she currently denies headaches, epigastric pain and visual disturbances.       REVIEW OF SYSTEMS:  CONSTITUTIONAL: No weakness, fevers or chills  EYES/ENT: No visual changes;  No vertigo or throat pain   NECK: No pain or stiffness  RESPIRATORY: No cough, wheezing, hemoptysis; No shortness of breath  CARDIOVASCULAR: No chest pain or palpitations  GASTROINTESTINAL: No abdominal or epigastric pain. No nausea, vomiting, or hematemesis; No diarrhea or constipation. No melena or hematochezia.  GENITOURINARY: No dysuria, frequency or hematuria  NEUROLOGICAL: No numbness or weakness  SKIN: No itching, burning, rashes, or lesions   Extremities: left calf pain with onset after seizure episode  All other review of systems is negative unless indicated above.    PAST MEDICAL & SURGICAL HISTORY:  No pertinent past medical history  No significant past surgical history    Allergies  No Known Allergies    FAMILY HISTORY:  Father alive and well.     Social History: Denies ETOH, smoking and drugs.     POB/GYN Hx:  No prior pregnancies.    Vital Signs:  Vital Signs Last 24 Hrs  T(C): 36.6 (2018 15:52), Max: 36.6 (2018 15:52)  T(F): 97.8 (2018 15:52), Max: 97.8 (2018 15:52)  HR: 110 (2018 15:52) (110 - 110)  BP: 136/71 (2018 15:52) (136/71 - 136/71)  BP(mean): --  RR: 18 (2018 15:52) (18 - 18)  SpO2: 100% (2018 15:52) (100% - 100%)    Physical Exam:  General: Adult female in NAD  Head/Neck: No neck masses, no lymphadenopathy  CVS: RRR, +S1/S2  Lungs: CTAB, no wheeze, rhonchi or rales.   Abdomen: +BS, soft, NT.  Pelvic: Deferred  Ext: No cyanosis, or edema. no calf tenderness.   Skin: No rashes, or lesions  Neuro: Normal DTRs, grossly intact    FHR: 120bpm, moderate variation, no decelerations. with accelerations No uterine contractions noted.     Labs:                                10.0   12.8  )-----------( 148      ( 2018 06:46 )             30.2         139  |  105  |  <3.0<L>  ----------------------------<  96  3.2<L>   |  21.0<L>  |  0.33<L>    Ca    6.3<LL>      2018 06:46  Mg     6.1         TPro  6.3<L>  /  Alb  3.4  /  TBili  0.3<L>  /  DBili  x   /  AST  23  /  ALT  9   /  AlkPhos  61      Magnesium, Serum (18 @ 06:47)    Magnesium, Serum: 6.1 mg/dL        Radiology:  < from: CT Head No Cont (18 @ 16:38) >  FINDINGS:   Ventricles and cortical sulci are within normal limits. There is   evidence of a few scattered cortical calcifications involving the   cerebral hemispheres that may represent sequela of remote infection.   There is no evidence of any mass effect or extra-axial collection.  Bony calvarium, visualized mastoids, sinuses and orbits are unremarkable.    IMPRESSION:   No CT evidence of any acute intracranial abnormality.      MEDICATIONS  (STANDING):  dextrose 5% + sodium chloride 0.9%. 1000 milliLiter(s) (999.99 mL/Hr) IV Continuous <Continuous>  magnesium sulfate Infusion 2 Gm/Hr (50 mL/Hr) IV Continuous <Continuous> CANDE KINNEY  A 28 yo  CHAD 18 by 1st trimester ultrasound done on 10/4/17. She is now 29 weeks and 5 days Gestational Age    Patient with Chief complaint of seizure activity. Seizure was tonic clonic generalized, patient was shaking, witnessed by family.  She was brought in by ambulance to the ED and transferred to L & D. No prior history of seizures.     Patient is receiving Magnesium Sulfate for seizure prophylaxis. She was seen by Neurology who believes that patient may have had a non-eclamptic seizure with plan to undergo MRI and EEG today. Recommends giving Keppra for recurrent seizure event.    Today, she denies headaches, epigastric pain and visual disturbances.       REVIEW OF SYSTEMS:  CONSTITUTIONAL: No weakness, fevers or chills  EYES/ENT: No visual changes;  No vertigo or throat pain   NECK: No pain or stiffness  RESPIRATORY: No cough, wheezing, hemoptysis; No shortness of breath  CARDIOVASCULAR: No chest pain or palpitations  GASTROINTESTINAL: No abdominal or epigastric pain. No nausea, vomiting, or hematemesis; No diarrhea or constipation. No melena or hematochezia.  GENITOURINARY: No dysuria, frequency or hematuria  NEUROLOGICAL: No numbness or weakness  SKIN: No itching, burning, rashes, or lesions   Extremities: left calf pain with onset after seizure episode  All other review of systems is negative unless indicated above.    PAST MEDICAL & SURGICAL HISTORY:  No pertinent past medical history  No significant past surgical history    Allergies  No Known Allergies    FAMILY HISTORY:  Father alive and well.     Social History: Denies ETOH, smoking and drugs.     POB/GYN Hx:  No prior pregnancies.    Vital Signs:  Vital Signs Last 24 Hrs  T(C): 36.6 (2018 15:52), Max: 36.6 (2018 15:52)  T(F): 97.8 (2018 15:52), Max: 97.8 (2018 15:52)  HR: 110 (2018 15:52) (110 - 110)  BP: 136/71 (2018 15:52) (136/71 - 136/71)  BP(mean): --  RR: 18 (2018 15:52) (18 - 18)  SpO2: 100% (2018 15:52) (100% - 100%)    Physical Exam:  General: Adult female in NAD  Head/Neck: No neck masses, no lymphadenopathy  CVS: RRR, +S1/S2  Lungs: CTAB, no wheeze, rhonchi or rales.   Abdomen: +BS, soft, NT.  Pelvic: Deferred  Ext: No cyanosis, or edema. no calf tenderness.   Skin: No rashes, or lesions  Neuro: Normal DTRs, grossly intact    FHR: 120bpm, moderate variation, no decelerations. with accelerations No uterine contractions noted.     Labs:                                10.0   12.8  )-----------( 148      ( 2018 06:46 )             30.2         139  |  105  |  <3.0<L>  ----------------------------<  96  3.2<L>   |  21.0<L>  |  0.33<L>    Ca    6.3<LL>      2018 06:46  Mg     6.1         TPro  6.3<L>  /  Alb  3.4  /  TBili  0.3<L>  /  DBili  x   /  AST  23  /  ALT  9   /  AlkPhos  61      Magnesium, Serum (18 @ 06:47)    Magnesium, Serum: 6.1 mg/dL        Radiology:  < from: CT Head No Cont (18 @ 16:38) >  FINDINGS:   Ventricles and cortical sulci are within normal limits. There is   evidence of a few scattered cortical calcifications involving the   cerebral hemispheres that may represent sequela of remote infection.   There is no evidence of any mass effect or extra-axial collection.  Bony calvarium, visualized mastoids, sinuses and orbits are unremarkable.    IMPRESSION:   No CT evidence of any acute intracranial abnormality.      MEDICATIONS  (STANDING):  dextrose 5% + sodium chloride 0.9%. 1000 milliLiter(s) (999.99 mL/Hr) IV Continuous <Continuous>  magnesium sulfate Infusion 2 Gm/Hr (50 mL/Hr) IV Continuous <Continuous>

## 2018-01-26 NOTE — DISCHARGE NOTE OB - CARE PLAN
Principal Discharge DX:	29 weeks gestation of pregnancy  Goal:	delivery at 36 weeks  Assessment and plan of treatment:	Follow-up with [facility] within 1 week. Offered prenatal care with Dr. Juan and [facility] clinic as she lives in [place].  Secondary Diagnosis:	Seizure  Goal:	Please follow-up with outpatient neurology. Inpatient workup negative. Principal Discharge DX:	29 weeks gestation of pregnancy  Goal:	delivery at 36 weeks  Assessment and plan of treatment:	Follow-up with Dr. Juan on Thursday, February 1st, 2018 at 130pm to establish prenatal care. Appointment has been scheduled.  Secondary Diagnosis:	Seizure  Goal:	Please follow-up with outpatient neurology. Inpatient workup negative. Will start on keppra for seizure prophylaxis

## 2018-01-26 NOTE — DISCHARGE NOTE OB - CARE PROVIDER_API CALL
Weston Juan), Obstetrics and Gynecology  [address] [place] [address]  Phone: [phone]  Fax: [phone]

## 2018-01-26 NOTE — PROGRESS NOTE ADULT - PROBLEM SELECTOR PLAN 3
Negative US Doppler bilateral lower ext to rule out DVT. Negative bilateral lower extremities US Doppler. No DVT.

## 2018-01-26 NOTE — PROGRESS NOTE ADULT - PROBLEM SELECTOR PLAN 2
Started prenatal care at [facility], no pregnancy complications reported by patient. Healthy fetus based on current fetal heart rate monitoring findings. Recommend close monitoring of maternal and fetal status.

## 2018-01-26 NOTE — DISCHARGE NOTE OB - MEDICATION SUMMARY - MEDICATIONS TO TAKE
I will START or STAY ON the medications listed below when I get home from the hospital:    Tylenol 325 mg oral capsule  -- 2 cap(s) by mouth 3 times a day   -- Indication: For Pain I will START or STAY ON the medications listed below when I get home from the hospital:    Tylenol 325 mg oral capsule  -- 2 cap(s) by mouth 3 times a day   -- Indication: For Pain    Keppra 500 mg oral tablet  -- 1 tab(s) by mouth 2 times a day   -- Check with your doctor before becoming pregnant.  It is very important that you take or use this exactly as directed.  Do not skip doses or discontinue unless directed by your doctor.  May cause drowsiness or dizziness.  Obtain medical advice before taking any non-prescription drugs as some may affect the action of this medication.  Swallow whole.  Do not crush.  This drug may impair the ability to drive or operate machinery.  Use care until you become familiar with its effects.    -- Indication: For Seizure prevention

## 2018-01-26 NOTE — DISCHARGE NOTE OB - PLAN OF CARE
delivery at 36 weeks Follow-up with [facility] within 1 week. Offered prenatal care with Dr. Juan and [facility] clinic as she lives in [place]. Please follow-up with outpatient neurology. Inpatient workup negative. Follow-up with Dr. Juan on Thursday, February 1st, 2018 at 130pm to establish prenatal care. Appointment has been scheduled. Please follow-up with outpatient neurology. Inpatient workup negative. Will start on keppra for seizure prophylaxis

## 2018-01-28 ENCOUNTER — OUTPATIENT (OUTPATIENT)
Dept: INPATIENT UNIT | Facility: HOSPITAL | Age: 28
LOS: 1 days | End: 2018-01-28
Payer: COMMERCIAL

## 2018-01-28 DIAGNOSIS — O47.03 FALSE LABOR BEFORE 37 COMPLETED WEEKS OF GESTATION, THIRD TRIMESTER: ICD-10-CM

## 2018-01-28 LAB
COLLECT DURATION TIME UR: 24 HR — SIGNIFICANT CHANGE UP
PROT 24H UR-MRATE: 154 MG/24HR — HIGH (ref 50–100)
TOTAL VOLUME - 24 HOUR: 1400 ML — SIGNIFICANT CHANGE UP

## 2018-01-28 PROCEDURE — 84156 ASSAY OF PROTEIN URINE: CPT

## 2018-02-01 ENCOUNTER — APPOINTMENT (OUTPATIENT)
Dept: OBGYN | Facility: CLINIC | Age: 28
End: 2018-02-01

## 2019-01-24 ENCOUNTER — EMERGENCY (EMERGENCY)
Facility: HOSPITAL | Age: 29
LOS: 1 days | Discharge: HOME | End: 2019-01-24
Attending: EMERGENCY MEDICINE
Payer: MEDICAID

## 2019-01-24 VITALS
HEIGHT: 65 IN | HEART RATE: 114 BPM | DIASTOLIC BLOOD PRESSURE: 73 MMHG | OXYGEN SATURATION: 100 % | SYSTOLIC BLOOD PRESSURE: 136 MMHG | RESPIRATION RATE: 20 BRPM | WEIGHT: 132.06 LBS | TEMPERATURE: 98 F

## 2019-01-24 VITALS
DIASTOLIC BLOOD PRESSURE: 76 MMHG | RESPIRATION RATE: 17 BRPM | TEMPERATURE: 99 F | HEART RATE: 100 BPM | SYSTOLIC BLOOD PRESSURE: 113 MMHG | OXYGEN SATURATION: 100 %

## 2019-01-24 LAB
ANION GAP SERPL CALC-SCNC: 11 MMOL/L — SIGNIFICANT CHANGE UP (ref 5–17)
BASOPHILS # BLD AUTO: 0 K/UL — SIGNIFICANT CHANGE UP (ref 0–0.2)
BASOPHILS NFR BLD AUTO: 0.1 % — SIGNIFICANT CHANGE UP (ref 0–2)
BUN SERPL-MCNC: 6 MG/DL — LOW (ref 8–20)
CALCIUM SERPL-MCNC: 8.9 MG/DL — SIGNIFICANT CHANGE UP (ref 8.6–10.2)
CHLORIDE SERPL-SCNC: 103 MMOL/L — SIGNIFICANT CHANGE UP (ref 98–107)
CO2 SERPL-SCNC: 26 MMOL/L — SIGNIFICANT CHANGE UP (ref 22–29)
CREAT SERPL-MCNC: 0.43 MG/DL — LOW (ref 0.5–1.3)
EOSINOPHIL # BLD AUTO: 0 K/UL — SIGNIFICANT CHANGE UP (ref 0–0.5)
EOSINOPHIL NFR BLD AUTO: 0.3 % — SIGNIFICANT CHANGE UP (ref 0–6)
GLUCOSE SERPL-MCNC: 101 MG/DL — SIGNIFICANT CHANGE UP (ref 70–115)
HCG SERPL-ACNC: <4 MIU/ML — SIGNIFICANT CHANGE UP
HCT VFR BLD CALC: 33 % — LOW (ref 37–47)
HGB BLD-MCNC: 10.9 G/DL — LOW (ref 12–16)
LYMPHOCYTES # BLD AUTO: 1.9 K/UL — SIGNIFICANT CHANGE UP (ref 1–4.8)
LYMPHOCYTES # BLD AUTO: 13.7 % — LOW (ref 20–55)
MCHC RBC-ENTMCNC: 28.6 PG — SIGNIFICANT CHANGE UP (ref 27–31)
MCHC RBC-ENTMCNC: 33 G/DL — SIGNIFICANT CHANGE UP (ref 32–36)
MCV RBC AUTO: 86.6 FL — SIGNIFICANT CHANGE UP (ref 81–99)
MONOCYTES # BLD AUTO: 1.5 K/UL — HIGH (ref 0–0.8)
MONOCYTES NFR BLD AUTO: 10.8 % — HIGH (ref 3–10)
NEUTROPHILS # BLD AUTO: 10.4 K/UL — HIGH (ref 1.8–8)
NEUTROPHILS NFR BLD AUTO: 74.8 % — HIGH (ref 37–73)
PLATELET # BLD AUTO: 255 K/UL — SIGNIFICANT CHANGE UP (ref 150–400)
POTASSIUM SERPL-MCNC: 4 MMOL/L — SIGNIFICANT CHANGE UP (ref 3.5–5.3)
POTASSIUM SERPL-SCNC: 4 MMOL/L — SIGNIFICANT CHANGE UP (ref 3.5–5.3)
RBC # BLD: 3.81 M/UL — LOW (ref 4.4–5.2)
RBC # FLD: 13.6 % — SIGNIFICANT CHANGE UP (ref 11–15.6)
SODIUM SERPL-SCNC: 140 MMOL/L — SIGNIFICANT CHANGE UP (ref 135–145)
WBC # BLD: 14 K/UL — HIGH (ref 4.8–10.8)
WBC # FLD AUTO: 14 K/UL — HIGH (ref 4.8–10.8)

## 2019-01-24 PROCEDURE — 70491 CT SOFT TISSUE NECK W/DYE: CPT | Mod: 26

## 2019-01-24 PROCEDURE — 87070 CULTURE OTHR SPECIMN AEROBIC: CPT

## 2019-01-24 PROCEDURE — 96365 THER/PROPH/DIAG IV INF INIT: CPT | Mod: XU

## 2019-01-24 PROCEDURE — 99234 HOSP IP/OBS SM DT SF/LOW 45: CPT

## 2019-01-24 PROCEDURE — 96366 THER/PROPH/DIAG IV INF ADDON: CPT | Mod: XU

## 2019-01-24 PROCEDURE — 99284 EMERGENCY DEPT VISIT MOD MDM: CPT | Mod: 25

## 2019-01-24 PROCEDURE — 36415 COLL VENOUS BLD VENIPUNCTURE: CPT

## 2019-01-24 PROCEDURE — 96367 TX/PROPH/DG ADDL SEQ IV INF: CPT | Mod: XU

## 2019-01-24 PROCEDURE — T1013: CPT

## 2019-01-24 PROCEDURE — 85027 COMPLETE CBC AUTOMATED: CPT

## 2019-01-24 PROCEDURE — G0378: CPT

## 2019-01-24 PROCEDURE — 70491 CT SOFT TISSUE NECK W/DYE: CPT

## 2019-01-24 PROCEDURE — 42700 I&D ABSCESS PERITONSILLAR: CPT | Mod: RT

## 2019-01-24 PROCEDURE — 96375 TX/PRO/DX INJ NEW DRUG ADDON: CPT | Mod: XU

## 2019-01-24 PROCEDURE — 84702 CHORIONIC GONADOTROPIN TEST: CPT

## 2019-01-24 PROCEDURE — 80048 BASIC METABOLIC PNL TOTAL CA: CPT

## 2019-01-24 RX ORDER — KETOROLAC TROMETHAMINE 30 MG/ML
30 SYRINGE (ML) INJECTION ONCE
Refills: 0 | Status: DISCONTINUED | OUTPATIENT
Start: 2019-01-24 | End: 2019-01-24

## 2019-01-24 RX ORDER — SODIUM CHLORIDE 9 MG/ML
3 INJECTION INTRAMUSCULAR; INTRAVENOUS; SUBCUTANEOUS EVERY 8 HOURS
Refills: 0 | Status: DISCONTINUED | OUTPATIENT
Start: 2019-01-24 | End: 2019-01-29

## 2019-01-24 RX ORDER — SODIUM CHLORIDE 9 MG/ML
1000 INJECTION, SOLUTION INTRAVENOUS
Refills: 0 | Status: DISCONTINUED | OUTPATIENT
Start: 2019-01-24 | End: 2019-01-29

## 2019-01-24 RX ORDER — AMPICILLIN SODIUM AND SULBACTAM SODIUM 250; 125 MG/ML; MG/ML
3 INJECTION, POWDER, FOR SUSPENSION INTRAMUSCULAR; INTRAVENOUS EVERY 6 HOURS
Refills: 0 | Status: DISCONTINUED | OUTPATIENT
Start: 2019-01-24 | End: 2019-01-29

## 2019-01-24 RX ORDER — AMPICILLIN SODIUM AND SULBACTAM SODIUM 250; 125 MG/ML; MG/ML
3 INJECTION, POWDER, FOR SUSPENSION INTRAMUSCULAR; INTRAVENOUS ONCE
Refills: 0 | Status: COMPLETED | OUTPATIENT
Start: 2019-01-24 | End: 2019-01-24

## 2019-01-24 RX ORDER — SODIUM CHLORIDE 9 MG/ML
1000 INJECTION INTRAMUSCULAR; INTRAVENOUS; SUBCUTANEOUS ONCE
Refills: 0 | Status: COMPLETED | OUTPATIENT
Start: 2019-01-24 | End: 2019-01-24

## 2019-01-24 RX ORDER — TETRACAINE/BENZOCAINE/BUTAMBEN 2%-14%-2%
1 OINTMENT (GRAM) TOPICAL ONCE
Refills: 0 | Status: COMPLETED | OUTPATIENT
Start: 2019-01-24 | End: 2019-01-24

## 2019-01-24 RX ORDER — LIDOCAINE HYDROCHLORIDE AND EPINEPHRINE 10; 10 MG/ML; UG/ML
1 INJECTION, SOLUTION INFILTRATION; PERINEURAL ONCE
Refills: 0 | Status: COMPLETED | OUTPATIENT
Start: 2019-01-24 | End: 2019-01-24

## 2019-01-24 RX ORDER — DEXAMETHASONE 0.5 MG/5ML
10 ELIXIR ORAL ONCE
Refills: 0 | Status: COMPLETED | OUTPATIENT
Start: 2019-01-24 | End: 2019-01-24

## 2019-01-24 RX ADMIN — SODIUM CHLORIDE 70 MILLILITER(S): 9 INJECTION, SOLUTION INTRAVENOUS at 13:08

## 2019-01-24 RX ADMIN — AMPICILLIN SODIUM AND SULBACTAM SODIUM 3 GRAM(S): 250; 125 INJECTION, POWDER, FOR SUSPENSION INTRAMUSCULAR; INTRAVENOUS at 20:02

## 2019-01-24 RX ADMIN — SODIUM CHLORIDE 1000 MILLILITER(S): 9 INJECTION INTRAMUSCULAR; INTRAVENOUS; SUBCUTANEOUS at 05:27

## 2019-01-24 RX ADMIN — AMPICILLIN SODIUM AND SULBACTAM SODIUM 200 GRAM(S): 250; 125 INJECTION, POWDER, FOR SUSPENSION INTRAMUSCULAR; INTRAVENOUS at 19:00

## 2019-01-24 RX ADMIN — SODIUM CHLORIDE 3 MILLILITER(S): 9 INJECTION INTRAMUSCULAR; INTRAVENOUS; SUBCUTANEOUS at 21:03

## 2019-01-24 RX ADMIN — Medication 30 MILLIGRAM(S): at 06:50

## 2019-01-24 RX ADMIN — AMPICILLIN SODIUM AND SULBACTAM SODIUM 200 GRAM(S): 250; 125 INJECTION, POWDER, FOR SUSPENSION INTRAMUSCULAR; INTRAVENOUS at 06:21

## 2019-01-24 RX ADMIN — AMPICILLIN SODIUM AND SULBACTAM SODIUM 200 GRAM(S): 250; 125 INJECTION, POWDER, FOR SUSPENSION INTRAMUSCULAR; INTRAVENOUS at 12:38

## 2019-01-24 RX ADMIN — SODIUM CHLORIDE 3 MILLILITER(S): 9 INJECTION INTRAMUSCULAR; INTRAVENOUS; SUBCUTANEOUS at 13:03

## 2019-01-24 RX ADMIN — Medication 1 SPRAY(S): at 18:48

## 2019-01-24 RX ADMIN — AMPICILLIN SODIUM AND SULBACTAM SODIUM 3 GRAM(S): 250; 125 INJECTION, POWDER, FOR SUSPENSION INTRAMUSCULAR; INTRAVENOUS at 13:08

## 2019-01-24 RX ADMIN — Medication 10 MILLIGRAM(S): at 06:22

## 2019-01-24 RX ADMIN — AMPICILLIN SODIUM AND SULBACTAM SODIUM 3 GRAM(S): 250; 125 INJECTION, POWDER, FOR SUSPENSION INTRAMUSCULAR; INTRAVENOUS at 07:00

## 2019-01-24 RX ADMIN — LIDOCAINE HYDROCHLORIDE AND EPINEPHRINE 1 MILLILITER(S): 10; 10 INJECTION, SOLUTION INFILTRATION; PERINEURAL at 18:47

## 2019-01-24 RX ADMIN — Medication 30 MILLIGRAM(S): at 06:20

## 2019-01-24 RX ADMIN — SODIUM CHLORIDE 3 MILLILITER(S): 9 INJECTION INTRAMUSCULAR; INTRAVENOUS; SUBCUTANEOUS at 06:24

## 2019-01-24 NOTE — ED PROVIDER NOTE - ENMT NEGATIVE STATEMENT, MLM
Ears: +ear pain and no hearing problems .Mouth/Throat: +dysphagia, +hoarseness and +throat pain. Neck: no lumps, + pain, no stiffness and no swollen glands.

## 2019-01-24 NOTE — ED CDU PROVIDER INITIAL DAY NOTE - PROGRESS NOTE DETAILS
Dr. Faust performing I&D at bedside now. Dispo pending per Dr. Faust's recommendation. I&D completed by Dr. Faust. Per his recommendation pt can go home in 2 hours if she is able to eat/drink. Pt will go home on Augmentin and follow up with him in 1.5-2 weeks. PT feeling better at this time, able to tolerate po, finished course of decadron pt to be d/c'd and f/u with Dr. Faust in 1-2 weeks.

## 2019-01-24 NOTE — ED PROVIDER NOTE - PHYSICAL EXAMINATION
pharynx with swollen noted -Right tonsilar swollen - uvula mild to the left - able to speak full sentence- right side of the corner of the mandible swollen - no mastoid TTP B/L

## 2019-01-24 NOTE — ED PROVIDER NOTE - OBJECTIVE STATEMENT
29 y/O female States no Sig Pmh present in Er and c/o pain on swallow and - right side around the ear and neck pain since last Friday - states she ws taking DayQuil for the pain and fever that was help full . states pain on swallow and she is not able to open her mouth widely . denies nay trauma . no other complint or concern  currently on menstruation .

## 2019-01-24 NOTE — ED CDU PROVIDER DISPOSITION NOTE - CLINICAL COURSE
Pt presented to ED complaining of pain with swallowing on the right side with pain radiating to the ear and neck since last friday. Pt was noted to have peritonsillar absecess and was started on unasyn and decadron. ENT Dr. Faust I&D and pt was able to tolerate po. Pt to be d/c on augmentin and f/u with Dr. Faust in 1-2 weeks.

## 2019-01-24 NOTE — ED CDU PROVIDER INITIAL DAY NOTE - MEDICAL DECISION MAKING DETAILS
Evidence of peritonsillar abscess on CT. Dr. Faust with ENT consulted, will come in at 5pm for I&D. Pt to receive Unasyn and Decadron in ED.

## 2019-01-24 NOTE — ED ADULT NURSE REASSESSMENT NOTE - NS ED NURSE REASSESS COMMENT FT1
Patient resting comfortably. VSS. Denies pain or discomfort. NPO maintained. IVF infusing as ordered. NSR on CM. Ambulatory. Awaits ENT consult. Patient updated on plan of care, verbalizes understanding. Will continue to monitor.

## 2019-01-24 NOTE — ED PROVIDER NOTE - PROGRESS NOTE DETAILS
spoke with Dr Faust - says will be in around 430 -5 pm after office hours for PTA I/D will see if pt can be moved into obs - he says good chance pt can be dc after drainage

## 2019-01-24 NOTE — ED CDU PROVIDER INITIAL DAY NOTE - OBJECTIVE STATEMENT
27 y/o female states no significant PMHx present in ER and c/o pain on swallow and - right side around the ear and neck pain since last Friday - states she was taking DayQuil for the pain and fever that was help full . States pain on swallow and she is not able to open her mouth widely. Denies any trauma. no other complint or concern. currently on menstruation.  Pt denies difficulty breathing, fever/chills, CP/SOB.

## 2019-01-24 NOTE — ED ADULT NURSE REASSESSMENT NOTE - NS ED NURSE REASSESS COMMENT FT1
I&D performed at the bedside for Right peritonsillar abscess by MD Faust. Cultures of abscess sent to [facility] as per orders. Now patient states pain improved, no s/s of active bleeding noted. PO trials to be started and if patient tolerates ok for d/c home on PO abx. VSS. Afebrile. NSR on CM, 100% O2 sat. Will continue to monitor.

## 2019-01-24 NOTE — ED ADULT NURSE NOTE - OBJECTIVE STATEMENT
pt A&Ox4, c/o right side of face from ear to neck pain and swelling that started Friday and worsen pt unable to open mouth fully, resp even and unlabored no distress noted pt able to swallow secretions and not drooling,

## 2019-01-24 NOTE — ED PROVIDER NOTE - MEDICAL DECISION MAKING DETAILS
5-6 pain on swallow with right side of the face swollen and  R/o PTA on the right side  labs - fluids - dexa- toradol - clinda - ct neck soft tissue

## 2019-01-24 NOTE — PROGRESS NOTE ADULT - SUBJECTIVE AND OBJECTIVE BOX
ENT consult - I & D right peritonsillar abscess done. If patient can take po fluids and meds in the next 2 hours, she can be discharged home on Augmentin BID X 10 days. If she is unable to swallow, she should be admitted for IV antibiotics.  See dictated consult report.

## 2019-01-24 NOTE — ED ADULT NURSE NOTE - NSIMPLEMENTINTERV_GEN_ALL_ED
Implemented All Universal Safety Interventions:  [place] to call system. Call bell, personal items and telephone within reach. Instruct patient to call for assistance. Room bathroom lighting operational. Non-slip footwear when patient is off stretcher. Physically safe environment: no spills, clutter or unnecessary equipment. Stretcher in lowest position, wheels locked, appropriate side rails in place.

## 2019-01-24 NOTE — ED CDU PROVIDER INITIAL DAY NOTE - ATTENDING CONTRIBUTION TO CARE
29 yo female no spmh comes to ed with 6 day history of rt tonsillar swelling with pain on swallowing; pt to be admitted to  [facility] for iv antibiotics , ct scan soft tissue of neck and ent evaluation;   pe awake in nad; throat moderate swelling left posterior pharynx;  await ent evaluation  and continue iv unasyn

## 2019-01-24 NOTE — ED PROVIDER NOTE - CONSTITUTIONAL, MLM
normal... Well appearing, well nourished, awake, alert, oriented to person, place, time/situation and in no apparent distress. thin appear- right corner of the mandible swollen

## 2019-01-24 NOTE — ED PROVIDER NOTE - ATTENDING CONTRIBUTION TO CARE
27 y/O female States no Sig Pmh present in Er and c/o pain on swallow and - right side around the ear and neck pain since last Friday - states she ws taking DayQuil for the pain and fever that was help full . states pain on swallow and she is not able to open her mouth widely . denies nay trauma . no other complint or concern  currently on menstruation .  Const: Awake, alert and oriented. In no acute distress. Well appearing.  HEENT: NC/AT. Moist mucous membranes. pharynx with swollen noted -Right tonsilar swollen - uvula mild to the left - able to speak full sentence- right side of the corner of the mandible swollen - no mastoid TTP B/L  Eyes: No scleral icterus. EOMI.  Neck:. Soft and supple. Full ROM without pain.  Cardiac: Regular rate and regular rhythm. +S1/S2. Peripheral pulses 2+ and symmetric. No LE edema.  Resp: Speaking in full sentences. No evidence of respiratory distress. No wheezes, rales or rhonchi.  Abd: Soft, non-tender, non-distended. Normal bowel sounds in all 4 quadrants. No guarding or rebound.  Back: Spine midline and non-tender. No CVAT.  Skin: No rashes, abrasions or lacerations.  Lymph: No cervical lymphadenopathy.  Neuro: Awake, alert & oriented x 3. Moves all extremities symmetrically.  r/o extension of PTA into deeper tissues and I&D

## 2019-01-24 NOTE — ED ADULT NURSE REASSESSMENT NOTE - NS ED NURSE REASSESS COMMENT FT1
received pt from previous Rn Maria M Rueda. pt resting in stretcher,  denies any c/o SOB, difficulty breathing. pt consuming PO fluids with no difficulties. BK Neff at bedside. remains afebrile VSS. bed in lowest position,call bell within reach and safety maintained.

## 2019-01-24 NOTE — ED ADULT NURSE REASSESSMENT NOTE - NS ED NURSE REASSESS COMMENT FT1
Transfusion infusing, no s/s of transfusion reaction noted. Per MD patient to be d/c after transfusion completes. SW called to arrange for patient's transportation to NH, patient not ambulatory. Patient in no apparent distress, Will continue to monitor. IV abx given, patient resting comfortably, no s/s of pain or distress. Awaits ENT consult. Will continue to monitor.

## 2019-01-24 NOTE — ED ADULT NURSE REASSESSMENT NOTE - NS ED NURSE REASSESS COMMENT FT1
Patient able to tolerate PO trials, no trouble swallowing. Feels mild pain, no s/s of active bleeding. VSS. Report given to nightshift JEWEL Bautista.

## 2019-01-24 NOTE — ED ADULT NURSE REASSESSMENT NOTE - NS ED NURSE REASSESS COMMENT FT1
Assumed care of the patient at 1130. Patient A&Ox3. No s/s of distress. States still feels some pain to her throat but pain improved now. Patient NPO, to see ENT this afternoon. In no apparent distress. VSS. Resting comfortably. Will continue to monitor.

## 2019-01-24 NOTE — ED CDU PROVIDER DISPOSITION NOTE - ATTENDING CONTRIBUTION TO CARE
seen with acp  tolerating po  s/p drainage of peritonsillar abses  will d/c on anbitics  Agree with acps assessment and d/c

## 2019-01-24 NOTE — ED ADULT NURSE REASSESSMENT NOTE - NS ED NURSE REASSESS COMMENT FT1
Dc instructions provided. educated pt with all written instructions. pt verbalizes understanding. pt with no questions or concerns. VSS and documented as per flow sheet. pt ambulated out ED with steady gait.

## 2019-01-26 LAB
CULTURE RESULTS: SIGNIFICANT CHANGE UP
SPECIMEN SOURCE: SIGNIFICANT CHANGE UP

## 2019-01-27 LAB
SPECIMEN SOURCE: SIGNIFICANT CHANGE UP

## 2019-02-14 ENCOUNTER — EMERGENCY (EMERGENCY)
Facility: HOSPITAL | Age: 29
LOS: 1 days | Discharge: HOME | End: 2019-02-14
Attending: EMERGENCY MEDICINE
Payer: MEDICAID

## 2019-02-14 VITALS
SYSTOLIC BLOOD PRESSURE: 120 MMHG | WEIGHT: 138.89 LBS | DIASTOLIC BLOOD PRESSURE: 70 MMHG | TEMPERATURE: 98 F | HEART RATE: 77 BPM | OXYGEN SATURATION: 100 % | RESPIRATION RATE: 20 BRPM | HEIGHT: 65 IN

## 2019-02-14 LAB
ALBUMIN SERPL ELPH-MCNC: 4.2 G/DL — SIGNIFICANT CHANGE UP (ref 3.3–5.2)
ALP SERPL-CCNC: 52 U/L — SIGNIFICANT CHANGE UP (ref 40–120)
ALT FLD-CCNC: 7 U/L — SIGNIFICANT CHANGE UP
AMPHET UR-MCNC: NEGATIVE — SIGNIFICANT CHANGE UP
ANION GAP SERPL CALC-SCNC: 10 MMOL/L — SIGNIFICANT CHANGE UP (ref 5–17)
APPEARANCE UR: CLEAR — SIGNIFICANT CHANGE UP
AST SERPL-CCNC: 12 U/L — SIGNIFICANT CHANGE UP
BACTERIA # UR AUTO: ABNORMAL
BARBITURATES UR SCN-MCNC: NEGATIVE — SIGNIFICANT CHANGE UP
BASOPHILS # BLD AUTO: 0 K/UL — SIGNIFICANT CHANGE UP (ref 0–0.2)
BASOPHILS NFR BLD AUTO: 0.1 % — SIGNIFICANT CHANGE UP (ref 0–2)
BENZODIAZ UR-MCNC: NEGATIVE — SIGNIFICANT CHANGE UP
BILIRUB SERPL-MCNC: 0.2 MG/DL — LOW (ref 0.4–2)
BILIRUB UR-MCNC: NEGATIVE — SIGNIFICANT CHANGE UP
BUN SERPL-MCNC: 9 MG/DL — SIGNIFICANT CHANGE UP (ref 8–20)
CALCIUM SERPL-MCNC: 8.5 MG/DL — LOW (ref 8.6–10.2)
CHLORIDE SERPL-SCNC: 104 MMOL/L — SIGNIFICANT CHANGE UP (ref 98–107)
CO2 SERPL-SCNC: 25 MMOL/L — SIGNIFICANT CHANGE UP (ref 22–29)
COCAINE METAB.OTHER UR-MCNC: NEGATIVE — SIGNIFICANT CHANGE UP
COLOR SPEC: YELLOW — SIGNIFICANT CHANGE UP
CREAT SERPL-MCNC: 0.58 MG/DL — SIGNIFICANT CHANGE UP (ref 0.5–1.3)
DIFF PNL FLD: NEGATIVE — SIGNIFICANT CHANGE UP
EOSINOPHIL # BLD AUTO: 0.1 K/UL — SIGNIFICANT CHANGE UP (ref 0–0.5)
EOSINOPHIL NFR BLD AUTO: 1 % — SIGNIFICANT CHANGE UP (ref 0–6)
EPI CELLS # UR: SIGNIFICANT CHANGE UP
GLUCOSE SERPL-MCNC: 109 MG/DL — SIGNIFICANT CHANGE UP (ref 70–115)
GLUCOSE UR QL: NEGATIVE MG/DL — SIGNIFICANT CHANGE UP
HCG UR QL: NEGATIVE — SIGNIFICANT CHANGE UP
HCT VFR BLD CALC: 33.3 % — LOW (ref 37–47)
HGB BLD-MCNC: 10.7 G/DL — LOW (ref 12–16)
KETONES UR-MCNC: ABNORMAL
LEUKOCYTE ESTERASE UR-ACNC: ABNORMAL
LYMPHOCYTES # BLD AUTO: 28.8 % — SIGNIFICANT CHANGE UP (ref 20–55)
LYMPHOCYTES # BLD AUTO: 3.2 K/UL — SIGNIFICANT CHANGE UP (ref 1–4.8)
MCHC RBC-ENTMCNC: 27.3 PG — SIGNIFICANT CHANGE UP (ref 27–31)
MCHC RBC-ENTMCNC: 32.1 G/DL — SIGNIFICANT CHANGE UP (ref 32–36)
MCV RBC AUTO: 84.9 FL — SIGNIFICANT CHANGE UP (ref 81–99)
METHADONE UR-MCNC: NEGATIVE — SIGNIFICANT CHANGE UP
MONOCYTES # BLD AUTO: 0.8 K/UL — SIGNIFICANT CHANGE UP (ref 0–0.8)
MONOCYTES NFR BLD AUTO: 6.9 % — SIGNIFICANT CHANGE UP (ref 3–10)
NEUTROPHILS # BLD AUTO: 7 K/UL — SIGNIFICANT CHANGE UP (ref 1.8–8)
NEUTROPHILS NFR BLD AUTO: 62.9 % — SIGNIFICANT CHANGE UP (ref 37–73)
NITRITE UR-MCNC: NEGATIVE — SIGNIFICANT CHANGE UP
OPIATES UR-MCNC: NEGATIVE — SIGNIFICANT CHANGE UP
PCP SPEC-MCNC: SIGNIFICANT CHANGE UP
PCP UR-MCNC: NEGATIVE — SIGNIFICANT CHANGE UP
PH UR: 6.5 — SIGNIFICANT CHANGE UP (ref 5–8)
PLATELET # BLD AUTO: 277 K/UL — SIGNIFICANT CHANGE UP (ref 150–400)
POTASSIUM SERPL-MCNC: 3.4 MMOL/L — LOW (ref 3.5–5.3)
POTASSIUM SERPL-SCNC: 3.4 MMOL/L — LOW (ref 3.5–5.3)
PROT SERPL-MCNC: 7.3 G/DL — SIGNIFICANT CHANGE UP (ref 6.6–8.7)
PROT UR-MCNC: 30 MG/DL
RBC # BLD: 3.92 M/UL — LOW (ref 4.4–5.2)
RBC # FLD: 14 % — SIGNIFICANT CHANGE UP (ref 11–15.6)
RBC CASTS # UR COMP ASSIST: SIGNIFICANT CHANGE UP /HPF (ref 0–4)
SODIUM SERPL-SCNC: 139 MMOL/L — SIGNIFICANT CHANGE UP (ref 135–145)
SP GR SPEC: 1.01 — SIGNIFICANT CHANGE UP (ref 1.01–1.02)
THC UR QL: NEGATIVE — SIGNIFICANT CHANGE UP
UROBILINOGEN FLD QL: NEGATIVE MG/DL — SIGNIFICANT CHANGE UP
WBC # BLD: 11.1 K/UL — HIGH (ref 4.8–10.8)
WBC # FLD AUTO: 11.1 K/UL — HIGH (ref 4.8–10.8)
WBC UR QL: SIGNIFICANT CHANGE UP

## 2019-02-14 PROCEDURE — 81001 URINALYSIS AUTO W/SCOPE: CPT

## 2019-02-14 PROCEDURE — 99284 EMERGENCY DEPT VISIT MOD MDM: CPT

## 2019-02-14 PROCEDURE — 36415 COLL VENOUS BLD VENIPUNCTURE: CPT

## 2019-02-14 PROCEDURE — 80307 DRUG TEST PRSMV CHEM ANLYZR: CPT

## 2019-02-14 PROCEDURE — 70450 CT HEAD/BRAIN W/O DYE: CPT | Mod: 26

## 2019-02-14 PROCEDURE — 81025 URINE PREGNANCY TEST: CPT

## 2019-02-14 PROCEDURE — 85027 COMPLETE CBC AUTOMATED: CPT

## 2019-02-14 PROCEDURE — 70450 CT HEAD/BRAIN W/O DYE: CPT

## 2019-02-14 PROCEDURE — T1013: CPT

## 2019-02-14 PROCEDURE — 80053 COMPREHEN METABOLIC PANEL: CPT

## 2019-02-14 RX ORDER — LEVETIRACETAM 250 MG/1
500 TABLET, FILM COATED ORAL ONCE
Refills: 0 | Status: COMPLETED | OUTPATIENT
Start: 2019-02-14 | End: 2019-02-14

## 2019-02-14 RX ORDER — SODIUM CHLORIDE 9 MG/ML
3 INJECTION INTRAMUSCULAR; INTRAVENOUS; SUBCUTANEOUS ONCE
Refills: 0 | Status: COMPLETED | OUTPATIENT
Start: 2019-02-14 | End: 2019-02-14

## 2019-02-14 RX ORDER — LEVETIRACETAM 250 MG/1
1 TABLET, FILM COATED ORAL
Qty: 60 | Refills: 0
Start: 2019-02-14 | End: 2019-03-15

## 2019-02-14 RX ADMIN — SODIUM CHLORIDE 3 MILLILITER(S): 9 INJECTION INTRAMUSCULAR; INTRAVENOUS; SUBCUTANEOUS at 23:13

## 2019-02-14 RX ADMIN — LEVETIRACETAM 500 MILLIGRAM(S): 250 TABLET, FILM COATED ORAL at 22:25

## 2019-02-14 NOTE — ED STATDOCS - OBJECTIVE STATEMENT
29 y/o F with no significant PMHx presents to the ED c/o headaches. States she was seen at clinic today for symptoms and advised to admit to ED for neurologist consult. 29 y/o F with no significant PMHx presents to the ED c/o headaches. States she was seen at clinic today for symptoms and advised to admit to ED for neurologist consult.  :

## 2019-02-14 NOTE — ED PROVIDER NOTE - OBJECTIVE STATEMENT
A 28 year old female pt with a hx of seizures presents to the ED s/p seizure. Pt had seiziure 1 year ago while pregnant A 28 year old female pt with a hx of seizures presents to the ED s/p seizure. Pt had seizure 2 years ago while pregnant, was placed on Keppra but stopped the Keppra due to breast feeding and did not return to taking it. Earlier today the pt experienced a generalized seizure, 1st seizure since her original seizure. Pt denies fever, chills, nausea or vomiting. Pt does not currently have a neurologist. No further complaints at this time.  used at bedside.

## 2019-02-14 NOTE — ED STATDOCS - PROGRESS NOTE DETAILS
27 y/o F with no significant PMHx presents to the ED c/o headaches, onset this morning. States she was seen at [facility] today for seizure and advised to admit to ED for neurologist consult. Per  reports pt had seizure while sleeping, states "her eyes were rolled back in her head, she was shaking, and had a lot of saliva in her mouth"; episode lasted 4 minutes. Past similar seizure episode while pregnant. States she is non compliant with Keppra. Denies cough, photophobia, fever, chills. LMP: 2/3/2019. Regular menstrual cycles.   : Amirah

## 2019-02-14 NOTE — ED PROVIDER NOTE - CLINICAL SUMMARY MEDICAL DECISION MAKING FREE TEXT BOX
A 28 year old female pt with a hx of seizures presents to the ED s/p seizure. Will check labs, await CT head results If negative will discharge home on 500 mg Keppra BID, neurology follow up.

## 2019-02-14 NOTE — ED PROVIDER NOTE - PROGRESS NOTE DETAILS
Labs and CT results as noted.  No recurrent seizures while in ED.  Pt stable for d/c on Keppra with Neuro f/u as outpt

## 2019-02-14 NOTE — ED ADULT TRIAGE NOTE - CHIEF COMPLAINT QUOTE
Patient arrived to ED today with c/o headaches.  Patient states she was seen at clinic today and told to come to ED to see a neurologist.

## 2019-02-14 NOTE — ED PROVIDER NOTE - NEUROLOGICAL COORDINATION
H&P Update:  Naida Ruiz was seen and examined. History and physical has been reviewed. The patient has been examined.  There have been no significant clinical changes since the completion of the originally dated History and Physical.    Signed By: Rhonda Hinojosa MD     September 5, 2017 10:07 AM normal

## 2019-02-15 VITALS
RESPIRATION RATE: 18 BRPM | TEMPERATURE: 98 F | HEART RATE: 77 BPM | SYSTOLIC BLOOD PRESSURE: 109 MMHG | DIASTOLIC BLOOD PRESSURE: 74 MMHG | OXYGEN SATURATION: 99 %

## 2019-05-24 NOTE — ED CDU PROVIDER INITIAL DAY NOTE - DATE/TIME 1
Vitamin D: take Ergocalciferol 50,000 units once weekly for the next 3 months, until your next clinic appointment.    Calcium: start elemental calcium 600 mg twice daily with meals (total 1,200 mg/day).    Once your vitamin D level is normal, we will start Zoledronic acid IV (Reclast) once a year for at least five years.    Fasting lab tests in August (at least three days before your next appointment), by 9AM.    Physical activity:  please perform weight-bearing exercises for at least 30 minutes three times per week. Examples: resistance training (eg, using free weights or resistance bands), jogging, jumping, and walking, are effective. Note: cycling and swimming are NOT weight-bearing exercises.              INSTRUCTIONS ON TAKING CALCIUM & VITAMIN D SUPPLEMENTS FOR YOUR BONE HEALTH    Calcium and vitamin D are important for the maintenance of healthy bones. Aside from supplements, various foods are good sources of calcium and vitamin D.    Dietary sources of calcium include:  - Dairy foods: milk, yoghurt, cheese  - Green vegetables: kale, devin greens, and broccoli  - Calcium-fortified foods: juice, cereal      Dietary sources of vitamin D include:  - Vitamin D fortified foods: milk, yoghurt, orange juice, cereals  - Fish: salmon, mackerel, canned tuna  - Cod liver oil    Sunlight is a major source of vitamin D.    Calcium supplements:  Your recommended daily intake of calcium is 1,200 mg of elemental calcium. Please note the following when purchasing over-the-counter calcium supplementation:    - Provided you have at least two daily sources of calcium in your diet, you may reduce your calcium supplement dose to 600 mg of elemental calcium daily.    - Read the label carefully; the ingredients list the amount of elemental calcium in each tablet. For example, calcium carbonate is 40% elemental calcium, so that 1,250 mg of calcium carbonate contains 500 mg of elemental calcium.     - Do not take more than 600 mg of  elemental calcium at one time. Larger doses will affect your body's absorption of calcium and other nutrients. If you need to take 1,200 mg of elemental calcium daily, you may take it in divided doses at mealtime e.g 600 mg of elemental calcium twice daily with meals.        Vitamin D supplements:  Your recommended dose of vitamin D is at least 1,200 international units (IU) daily (up to 2,000 international units).               24-Jan-2019 18:35

## 2020-09-01 NOTE — PATIENT PROFILE OB - NS PRO TALK SOMEONE YN
Detail Level: Detailed
Information: This plan will allow you to select a body location and will not render the procedure on the note output. It will note the location you select in the morphology.
no

## 2021-04-20 NOTE — ED STATDOCS - NS ED ATTENDING STATEMENT MOD
osteoarthritis of right knee I have personally performed a face to face diagnostic evaluation on this patient. I have reviewed the ACP note and agree with the history, exam and plan of care, except as noted.

## 2022-02-25 NOTE — PROGRESS NOTE ADULT - PROBLEM SELECTOR PROBLEM 1
Eclampsia complicating pregnancy in third trimester Graft Donor Site Bandage (Optional-Leave Blank If You Don't Want In Note): Steri-strips and a pressure bandage were applied to the donor site.

## 2023-08-21 NOTE — ED ADULT NURSE NOTE - DISCHARGE DATE/TIME
Health Maintenance   Topic Date Due    Pneumococcal 0-64 years Vaccine (1 - PCV) Never done    HIV screen  Never done    Diabetic retinal exam  Never done    Hepatitis C screen  Never done    DTaP/Tdap/Td vaccine (1 - Tdap) Never done    Colorectal Cancer Screen  Never done    Shingles vaccine (1 of 2) Never done    GFR test (Diabetes, CKD 3-4, OR last GFR 15-59)  06/13/2020    Diabetic Alb to Cr ratio (uACR) test  08/24/2020    Lipids  08/24/2020    COVID-19 Vaccine (3 - Booster for Pfizer series) 07/03/2021    Diabetic foot exam  09/14/2021    Flu vaccine (1) Never done    A1C test (Diabetic or Prediabetic)  08/16/2023    Depression Screen  08/16/2023    Hepatitis A vaccine  Aged Out    Hib vaccine  Aged Out    Meningococcal (ACWY) vaccine  Aged Out             (applicable per patient's age: Cancer Screenings, Depression Screening, Fall Risk Screening, Immunizations)    Hemoglobin A1C (%)   Date Value   08/16/2022 8.6   09/14/2020 7.4   08/29/2019 5.9     LDL Cholesterol (mg/dL)   Date Value   08/24/2019 93     AST (U/L)   Date Value   06/11/2019 38     ALT (U/L)   Date Value   06/11/2019 54 (H)     BUN (mg/dL)   Date Value   06/13/2019 10      (goal A1C is < 7)   (goal LDL is <100) need 30-50% reduction from baseline     BP Readings from Last 3 Encounters:   08/16/22 (!) 188/92   09/14/20 (!) 150/84   08/29/19 108/71    (goal /80)      All Future Testing planned in [facility]:  Lab Frequency Next Occurrence       Next Visit Date:  No future appointments.          Patient Active Problem List:     Controlled type 2 diabetes mellitus without complication, without long-term current use of insulin ([address])     Tobacco abuse     Essential hypertension     Hypokalemia
Patient scheduled.
24-Jan-2019 21:30

## 2023-09-14 NOTE — PATIENT PROFILE OB - AS SC BRADEN ACTIVITY
09/14/23                            Dayanara WYMAN Mart  [address] [facility] [id number]    To Whom It May Concern:    This is to certify Dayanara WYMAN Mart was evaluated with SAI Siu on 09/14/23 and can return to regular work on 09/16/23.              Electronically signed by:  SAI Siu  [facility] [place]  [address] [address] [place] [id number]  Dept Phone: [phone]        (4) walks frequently
